# Patient Record
Sex: FEMALE | Race: WHITE
[De-identification: names, ages, dates, MRNs, and addresses within clinical notes are randomized per-mention and may not be internally consistent; named-entity substitution may affect disease eponyms.]

---

## 2017-10-17 ENCOUNTER — HOSPITAL ENCOUNTER (OUTPATIENT)
Dept: HOSPITAL 55 - SURG | Age: 37
Discharge: HOME | End: 2017-10-17
Attending: INTERNAL MEDICINE
Payer: COMMERCIAL

## 2017-10-17 VITALS
HEART RATE: 65 BPM | DIASTOLIC BLOOD PRESSURE: 61 MMHG | SYSTOLIC BLOOD PRESSURE: 102 MMHG | OXYGEN SATURATION: 100 % | RESPIRATION RATE: 16 BRPM | TEMPERATURE: 98.7 F

## 2017-10-17 DIAGNOSIS — R10.11: Primary | ICD-10-CM

## 2017-10-17 PROCEDURE — 43235 EGD DIAGNOSTIC BRUSH WASH: CPT

## 2019-03-26 ENCOUNTER — HOSPITAL ENCOUNTER (OUTPATIENT)
Dept: HOSPITAL 55 - SURG | Age: 39
Discharge: HOME | DRG: 392 | End: 2019-03-26
Attending: INTERNAL MEDICINE
Payer: COMMERCIAL

## 2019-03-26 VITALS
DIASTOLIC BLOOD PRESSURE: 61 MMHG | RESPIRATION RATE: 18 BRPM | SYSTOLIC BLOOD PRESSURE: 105 MMHG | TEMPERATURE: 96.7 F | OXYGEN SATURATION: 100 % | HEART RATE: 61 BPM

## 2019-03-26 DIAGNOSIS — R68.81: ICD-10-CM

## 2019-03-26 DIAGNOSIS — K31.9: ICD-10-CM

## 2019-03-26 DIAGNOSIS — K31.84: ICD-10-CM

## 2019-03-26 DIAGNOSIS — R11.0: ICD-10-CM

## 2019-03-26 DIAGNOSIS — K44.9: ICD-10-CM

## 2019-03-26 DIAGNOSIS — K59.00: ICD-10-CM

## 2019-03-26 DIAGNOSIS — L53.8: ICD-10-CM

## 2019-03-26 DIAGNOSIS — R10.11: Primary | ICD-10-CM

## 2019-03-26 DIAGNOSIS — K64.8: ICD-10-CM

## 2019-03-26 PROCEDURE — 99001 SPECIMEN HANDLING PT-LAB: CPT

## 2021-08-10 ENCOUNTER — TRANSCRIBE ORDERS (OUTPATIENT)
Dept: OTHER | Age: 41
End: 2021-08-10

## 2021-08-10 DIAGNOSIS — I73.89 ACROCYANOSIS (H): ICD-10-CM

## 2021-08-10 DIAGNOSIS — R53.1 WEAKNESS: Primary | ICD-10-CM

## 2021-08-22 ENCOUNTER — HEALTH MAINTENANCE LETTER (OUTPATIENT)
Age: 41
End: 2021-08-22

## 2021-10-07 NOTE — TELEPHONE ENCOUNTER
FUTURE VISIT INFORMATION      FUTURE VISIT INFORMATION:    Date: 10/28/2021    Time: 230pm    Location: [facility]  REFERRAL INFORMATION:    Referring provider:  Virgie ZUÑIGA CNP     Referring providers clinic:  [facility]     Reason for visit/diagnosis  Weakness     RECORDS REQUESTED FROM:       Clinic name Comments Records Status Imaging Status   [facility]-7/28/2021, 8/3/2021    MRI Head-10/19/2020 Care Everywhere Requested to PACS                                   10/7/2021-Request for images faxed to [facility] @ 1114am    10/26/2021-[facility] Images now in PACS-MR @ 607am

## 2021-10-17 ENCOUNTER — HEALTH MAINTENANCE LETTER (OUTPATIENT)
Age: 41
End: 2021-10-17

## 2021-10-26 ASSESSMENT — ENCOUNTER SYMPTOMS
MUSCLE CRAMPS: 1
ABDOMINAL PAIN: 1
DECREASED APPETITE: 1
SYNCOPE: 0
STIFFNESS: 1
WEIGHT LOSS: 0
ALTERED TEMPERATURE REGULATION: 1
HYPOTENSION: 1
NAUSEA: 1
WEAKNESS: 1
TROUBLE SWALLOWING: 1
HEADACHES: 1
NECK MASS: 0
JAUNDICE: 0
MYALGIAS: 1
EYE IRRITATION: 0
EYE PAIN: 0
RECTAL PAIN: 0
NUMBNESS: 1
NERVOUS/ANXIOUS: 1
BRUISES/BLEEDS EASILY: 1
JOINT SWELLING: 1
BLOOD IN STOOL: 0
HEARTBURN: 1
PARALYSIS: 0
DISTURBANCES IN COORDINATION: 1
HALLUCINATIONS: 1
HYPERTENSION: 0
PALPITATIONS: 0
EXERCISE INTOLERANCE: 1
EYE REDNESS: 0
SKIN CHANGES: 0
BLOATING: 1
BOWEL INCONTINENCE: 0
DOUBLE VISION: 0
NECK PAIN: 1
HOARSE VOICE: 0
DIZZINESS: 1
TREMORS: 0
LOSS OF CONSCIOUSNESS: 0
EYE WATERING: 0
MEMORY LOSS: 0
TINGLING: 1
POOR WOUND HEALING: 0
CHILLS: 0
SORE THROAT: 0
POLYPHAGIA: 0
SINUS PAIN: 0
FEVER: 0
CONSTIPATION: 1
SMELL DISTURBANCE: 0
NIGHT SWEATS: 1
INCREASED ENERGY: 1
POLYDIPSIA: 1
SWOLLEN GLANDS: 1
INSOMNIA: 1
PANIC: 0
SPEECH CHANGE: 1
TASTE DISTURBANCE: 0
VOMITING: 0
SLEEP DISTURBANCES DUE TO BREATHING: 0
WEIGHT GAIN: 1
SEIZURES: 0
LIGHT-HEADEDNESS: 1
ARTHRALGIAS: 1
DIARRHEA: 1
DECREASED CONCENTRATION: 1
BACK PAIN: 1
ORTHOPNEA: 0
LEG PAIN: 1
DEPRESSION: 1
MUSCLE WEAKNESS: 1
FATIGUE: 1
SINUS CONGESTION: 1
NAIL CHANGES: 1

## 2021-10-28 ENCOUNTER — OFFICE VISIT (OUTPATIENT)
Dept: NEUROLOGY | Facility: CLINIC | Age: 41
End: 2021-10-28
Payer: COMMERCIAL

## 2021-10-28 ENCOUNTER — LAB (OUTPATIENT)
Dept: LAB | Facility: CLINIC | Age: 41
End: 2021-10-28
Payer: COMMERCIAL

## 2021-10-28 ENCOUNTER — PRE VISIT (OUTPATIENT)
Dept: NEUROLOGY | Facility: CLINIC | Age: 41
End: 2021-10-28

## 2021-10-28 VITALS
SYSTOLIC BLOOD PRESSURE: 103 MMHG | DIASTOLIC BLOOD PRESSURE: 68 MMHG | HEART RATE: 58 BPM | OXYGEN SATURATION: 100 % | RESPIRATION RATE: 16 BRPM

## 2021-10-28 DIAGNOSIS — M79.7 FIBROMYALGIA: ICD-10-CM

## 2021-10-28 DIAGNOSIS — M79.7 FIBROMYALGIA: Primary | ICD-10-CM

## 2021-10-28 LAB
CK SERPL-CCNC: 62 U/L (ref 30–225)
HOLD SPECIMEN: NORMAL
VIT B12 SERPL-MCNC: 575 PG/ML (ref 193–986)

## 2021-10-28 PROCEDURE — 82607 VITAMIN B-12: CPT | Performed by: PATHOLOGY

## 2021-10-28 PROCEDURE — 36415 COLL VENOUS BLD VENIPUNCTURE: CPT | Performed by: PATHOLOGY

## 2021-10-28 PROCEDURE — 99204 OFFICE O/P NEW MOD 45 MIN: CPT | Performed by: PSYCHIATRY & NEUROLOGY

## 2021-10-28 PROCEDURE — 82550 ASSAY OF CK (CPK): CPT | Performed by: PATHOLOGY

## 2021-10-28 PROCEDURE — 82085 ASSAY OF ALDOLASE: CPT | Mod: 90 | Performed by: PATHOLOGY

## 2021-10-28 PROCEDURE — 82595 ASSAY OF CRYOGLOBULIN: CPT | Mod: 90 | Performed by: PATHOLOGY

## 2021-10-28 RX ORDER — NORTRIPTYLINE HCL 10 MG
10 CAPSULE ORAL AT BEDTIME
Qty: 30 CAPSULE | Refills: 11 | Status: SHIPPED | OUTPATIENT
Start: 2021-10-28 | End: 2022-01-27

## 2021-10-28 RX ORDER — ALPRAZOLAM 0.5 MG
TABLET ORAL
COMMUNITY
Start: 2020-06-04 | End: 2022-01-27

## 2021-10-28 RX ORDER — BUPROPION HYDROCHLORIDE 300 MG/1
TABLET ORAL
COMMUNITY
Start: 2021-10-12 | End: 2022-01-27

## 2021-10-28 RX ORDER — MULTIVIT-MIN/IRON/FOLIC ACID/K 18-600-40
CAPSULE ORAL
COMMUNITY
End: 2023-06-29

## 2021-10-28 RX ORDER — RIZATRIPTAN BENZOATE 10 MG/1
TABLET ORAL
COMMUNITY
Start: 2021-09-17 | End: 2022-08-10

## 2021-10-28 RX ORDER — POLYETHYLENE GLYCOL 3350 17 G/17G
POWDER, FOR SOLUTION ORAL
COMMUNITY
End: 2023-01-17

## 2021-10-28 RX ORDER — PROPRANOLOL HYDROCHLORIDE 80 MG/1
TABLET ORAL
COMMUNITY
Start: 2021-08-16 | End: 2022-01-27

## 2021-10-28 RX ORDER — PREDNISONE 20 MG/1
TABLET ORAL
COMMUNITY
Start: 2021-05-14 | End: 2022-01-27

## 2021-10-28 RX ORDER — TOPIRAMATE 50 MG/1
50 TABLET, FILM COATED ORAL
COMMUNITY
Start: 2021-10-12 | End: 2022-08-04

## 2021-10-28 RX ORDER — TRIAMCINOLONE ACETONIDE 55 UG/1
1 SPRAY, METERED NASAL
COMMUNITY

## 2021-10-28 RX ORDER — NORETHINDRONE ACETATE AND ETHINYL ESTRADIOL, ETHINYL ESTRADIOL AND FERROUS FUMARATE 1MG-10(24)
KIT ORAL
COMMUNITY
End: 2022-08-11

## 2021-10-28 RX ORDER — NAPROXEN 500 MG/1
TABLET ORAL
COMMUNITY
Start: 2021-06-07

## 2021-10-28 RX ORDER — CETIRIZINE HYDROCHLORIDE 10 MG/1
TABLET ORAL
COMMUNITY
Start: 2020-05-10 | End: 2024-02-16

## 2021-10-28 RX ORDER — LEVOTHYROXINE SODIUM 112 UG/1
112 TABLET ORAL
COMMUNITY
Start: 2021-05-20 | End: 2024-07-16

## 2021-10-28 RX ORDER — TRAMADOL HYDROCHLORIDE 50 MG/1
TABLET ORAL
COMMUNITY
End: 2023-01-16

## 2021-10-28 RX ORDER — CLINDAMYCIN PHOSPHATE 10 MG/G
GEL TOPICAL
COMMUNITY
Start: 2021-08-20

## 2021-10-28 RX ORDER — ONDANSETRON 4 MG/1
TABLET, FILM COATED ORAL
COMMUNITY
End: 2023-06-29

## 2021-10-28 ASSESSMENT — PATIENT HEALTH QUESTIONNAIRE - PHQ9: SUM OF ALL RESPONSES TO PHQ QUESTIONS 1-9: 10

## 2021-10-28 ASSESSMENT — PAIN SCALES - GENERAL: PAINLEVEL: MODERATE PAIN (5)

## 2021-10-28 NOTE — LETTER
10/28/2021       RE: Hannah Carl  [address] [address] [address]     Dear Colleague,    Thank you for referring your patient, Hannah Carl, to the [facility] NEUROLOGY CLINIC [place] at [facility]. Please see a copy of my visit note below.      Answers for HPI/ROS submitted by the patient on 10/26/2021  General Symptoms: Yes  Skin Symptoms: Yes  HENT Symptoms: Yes  EYE SYMPTOMS: Yes  HEART SYMPTOMS: Yes  LUNG SYMPTOMS: No  INTESTINAL SYMPTOMS: Yes  URINARY SYMPTOMS: No  GYNECOLOGIC SYMPTOMS: No  BREAST SYMPTOMS: No  SKELETAL SYMPTOMS: Yes  BLOOD SYMPTOMS: Yes  NERVOUS SYSTEM SYMPTOMS: Yes  MENTAL HEALTH SYMPTOMS: Yes  Ear pain: No  Ear discharge: No  Hearing loss: No  Tinnitus: Yes  Nosebleeds: No  Congestion: Yes  Sinus pain: No  Trouble swallowing: Yes   Voice hoarseness: No  Mouth sores: No  Sore throat: No  Tooth pain: No  Gum tenderness: No  Bleeding gums: No  Change in taste: No  Change in sense of smell: No  Dry mouth: Yes  Hearing aid used: No  Neck lump: No  Fever: No  Loss of appetite: Yes  Weight loss: No  Weight gain: Yes  Fatigue: Yes  Night sweats: Yes  Chills: No  Increased stress: Yes  Excessive hunger: No  Excessive thirst: Yes  Feeling hot or cold when others believe the temperature is normal: Yes  Loss of height: Yes  Post-operative complications: No  Surgical site pain: No  Hallucinations: Yes  Change in or Loss of Energy: Yes  Hyperactivity: No  Confusion: Yes  Changes in hair: No  Changes in moles/birth marks: No  Itching: Yes  Rashes: No  Changes in nails: Yes  Acne: No  Hair in places you don't want it: No  Change in facial hair: No  Warts: No  Non-healing sores: No  Scarring: No  Flaking of skin: No  Color changes of hands/feet in cold : Yes  Sun sensitivity: No  Skin thickening: No  Eye pain: No  Vision loss: No  Dry eyes: Yes  Watery eyes: No  Eye bulging: No  Double vision: No  Flashing of lights:  Yes  Spots: Yes  Floaters: Yes  Redness: No  Crossed eyes: No  Tunnel Vision: No  Yellowing of eyes: No  Eye irritation: No  Chest pain or pressure: No  Fast or irregular heartbeat: No  Pain in legs with walking: Yes  Trouble breathing while lying down: No  Fingers or toes appear blue: Yes  High blood pressure: No  Low blood pressure: Yes  Fainting: No  Murmurs: No  Pacemaker: No  Varicose veins: Yes  Wake up at night with shortness of breath: No  Light-headedness: Yes  Exercise intolerance: Yes  Heart burn or indigestion: Yes  Nausea: Yes  Vomiting: No  Abdominal pain: Yes  Bloating: Yes  Constipation: Yes  Diarrhea: Yes  Blood in stool: No  Black stools: No  Rectal or Anal pain: No  Fecal incontinence: No  Yellowing of skin or eyes: No  Vomit with blood: No  Change in stools: No  Back pain: Yes  Muscle aches: Yes  Neck pain: Yes  Swollen joints: Yes  Joint pain: Yes  Bone pain: Yes  Muscle cramps: Yes  Muscle weakness: Yes  Joint stiffness: Yes  Bone fracture: No  Edema or swelling: Yes  Anemia: No  Swollen glands: Yes  Easy bleeding or bruising: Yes  Trouble with coordination: Yes  Dizziness or trouble with balance: Yes  Fainting or black-out spells: No  Memory loss: No  Headache: Yes  Seizures: No  Speech problems: Yes  Tingling: Yes  Tremor: No  Weakness: Yes  Difficulty walking: Yes  Paralysis: No  Numbness: Yes  Nervous or Anxious: Yes  Depression: Yes  Trouble sleeping: Yes  Trouble thinking or concentrating: Yes  Mood changes: Yes  Panic attacks: No          Again, thank you for allowing me to participate in the care of your patient.      Sincerely,    Rudi Meier MD

## 2021-10-28 NOTE — NURSING NOTE
Chief Complaint   Patient presents with     Consult     [facility] [place]      Depression Response    Patient completed the PHQ-9 assessment for depression and scored >9? Yes  Question 9 on the PHQ-9 was positive for suicidality? No  Does patient have current mental health provider? No    Is this a virtual visit? No    I personally notified the following: clinic nurse    Patient declined to speak with a nurse for further follow up regarding the depression screening

## 2021-10-28 NOTE — PROGRESS NOTES
Answers for HPI/ROS submitted by the patient on 10/26/2021  General Symptoms: Yes  Skin Symptoms: Yes  HENT Symptoms: Yes  EYE SYMPTOMS: Yes  HEART SYMPTOMS: Yes  LUNG SYMPTOMS: No  INTESTINAL SYMPTOMS: Yes  URINARY SYMPTOMS: No  GYNECOLOGIC SYMPTOMS: No  BREAST SYMPTOMS: No  SKELETAL SYMPTOMS: Yes  BLOOD SYMPTOMS: Yes  NERVOUS SYSTEM SYMPTOMS: Yes  MENTAL HEALTH SYMPTOMS: Yes  Ear pain: No  Ear discharge: No  Hearing loss: No  Tinnitus: Yes  Nosebleeds: No  Congestion: Yes  Sinus pain: No  Trouble swallowing: Yes   Voice hoarseness: No  Mouth sores: No  Sore throat: No  Tooth pain: No  Gum tenderness: No  Bleeding gums: No  Change in taste: No  Change in sense of smell: No  Dry mouth: Yes  Hearing aid used: No  Neck lump: No  Fever: No  Loss of appetite: Yes  Weight loss: No  Weight gain: Yes  Fatigue: Yes  Night sweats: Yes  Chills: No  Increased stress: Yes  Excessive hunger: No  Excessive thirst: Yes  Feeling hot or cold when others believe the temperature is normal: Yes  Loss of height: Yes  Post-operative complications: No  Surgical site pain: No  Hallucinations: Yes  Change in or Loss of Energy: Yes  Hyperactivity: No  Confusion: Yes  Changes in hair: No  Changes in moles/birth marks: No  Itching: Yes  Rashes: No  Changes in nails: Yes  Acne: No  Hair in places you don't want it: No  Change in facial hair: No  Warts: No  Non-healing sores: No  Scarring: No  Flaking of skin: No  Color changes of hands/feet in cold : Yes  Sun sensitivity: No  Skin thickening: No  Eye pain: No  Vision loss: No  Dry eyes: Yes  Watery eyes: No  Eye bulging: No  Double vision: No  Flashing of lights: Yes  Spots: Yes  Floaters: Yes  Redness: No  Crossed eyes: No  Tunnel Vision: No  Yellowing of eyes: No  Eye irritation: No  Chest pain or pressure: No  Fast or irregular heartbeat: No  Pain in legs with walking: Yes  Trouble breathing while lying down: No  Fingers or toes appear blue: Yes  High blood pressure: No  Low blood  pressure: Yes  Fainting: No  Murmurs: No  Pacemaker: No  Varicose veins: Yes  Wake up at night with shortness of breath: No  Light-headedness: Yes  Exercise intolerance: Yes  Heart burn or indigestion: Yes  Nausea: Yes  Vomiting: No  Abdominal pain: Yes  Bloating: Yes  Constipation: Yes  Diarrhea: Yes  Blood in stool: No  Black stools: No  Rectal or Anal pain: No  Fecal incontinence: No  Yellowing of skin or eyes: No  Vomit with blood: No  Change in stools: No  Back pain: Yes  Muscle aches: Yes  Neck pain: Yes  Swollen joints: Yes  Joint pain: Yes  Bone pain: Yes  Muscle cramps: Yes  Muscle weakness: Yes  Joint stiffness: Yes  Bone fracture: No  Edema or swelling: Yes  Anemia: No  Swollen glands: Yes  Easy bleeding or bruising: Yes  Trouble with coordination: Yes  Dizziness or trouble with balance: Yes  Fainting or black-out spells: No  Memory loss: No  Headache: Yes  Seizures: No  Speech problems: Yes  Tingling: Yes  Tremor: No  Weakness: Yes  Difficulty walking: Yes  Paralysis: No  Numbness: Yes  Nervous or Anxious: Yes  Depression: Yes  Trouble sleeping: Yes  Trouble thinking or concentrating: Yes  Mood changes: Yes  Panic attacks: No

## 2021-10-28 NOTE — LETTER
10/28/2021       RE: Hannah Carl  [address] [address] [address]     Dear Colleague,    Thank you for referring your patient, Hannah Carl, to the [facility] NEUROLOGY CLINIC [place] at [facility]. Please see a copy of my visit note below.    Answers for HPI/ROS submitted by the patient on 10/26/2021  General Symptoms: Yes  Skin Symptoms: Yes  HENT Symptoms: Yes  EYE SYMPTOMS: Yes  HEART SYMPTOMS: Yes  LUNG SYMPTOMS: No  INTESTINAL SYMPTOMS: Yes  URINARY SYMPTOMS: No  GYNECOLOGIC SYMPTOMS: No  BREAST SYMPTOMS: No  SKELETAL SYMPTOMS: Yes  BLOOD SYMPTOMS: Yes  NERVOUS SYSTEM SYMPTOMS: Yes  MENTAL HEALTH SYMPTOMS: Yes  Ear pain: No  Ear discharge: No  Hearing loss: No  Tinnitus: Yes  Nosebleeds: No  Congestion: Yes  Sinus pain: No  Trouble swallowing: Yes   Voice hoarseness: No  Mouth sores: No  Sore throat: No  Tooth pain: No  Gum tenderness: No  Bleeding gums: No  Change in taste: No  Change in sense of smell: No  Dry mouth: Yes  Hearing aid used: No  Neck lump: No  Fever: No  Loss of appetite: Yes  Weight loss: No  Weight gain: Yes  Fatigue: Yes  Night sweats: Yes  Chills: No  Increased stress: Yes  Excessive hunger: No  Excessive thirst: Yes  Feeling hot or cold when others believe the temperature is normal: Yes  Loss of height: Yes  Post-operative complications: No  Surgical site pain: No  Hallucinations: Yes  Change in or Loss of Energy: Yes  Hyperactivity: No  Confusion: Yes  Changes in hair: No  Changes in moles/birth marks: No  Itching: Yes  Rashes: No  Changes in nails: Yes  Acne: No  Hair in places you don't want it: No  Change in facial hair: No  Warts: No  Non-healing sores: No  Scarring: No  Flaking of skin: No  Color changes of hands/feet in cold : Yes  Sun sensitivity: No  Skin thickening: No  Eye pain: No  Vision loss: No  Dry eyes: Yes  Watery eyes: No  Eye bulging: No  Double vision: No  Flashing of lights:  Yes  Spots: Yes  Floaters: Yes  Redness: No  Crossed eyes: No  Tunnel Vision: No  Yellowing of eyes: No  Eye irritation: No  Chest pain or pressure: No  Fast or irregular heartbeat: No  Pain in legs with walking: Yes  Trouble breathing while lying down: No  Fingers or toes appear blue: Yes  High blood pressure: No  Low blood pressure: Yes  Fainting: No  Murmurs: No  Pacemaker: No  Varicose veins: Yes  Wake up at night with shortness of breath: No  Light-headedness: Yes  Exercise intolerance: Yes  Heart burn or indigestion: Yes  Nausea: Yes  Vomiting: No  Abdominal pain: Yes  Bloating: Yes  Constipation: Yes  Diarrhea: Yes  Blood in stool: No  Black stools: No  Rectal or Anal pain: No  Fecal incontinence: No  Yellowing of skin or eyes: No  Vomit with blood: No  Change in stools: No  Back pain: Yes  Muscle aches: Yes  Neck pain: Yes  Swollen joints: Yes  Joint pain: Yes  Bone pain: Yes  Muscle cramps: Yes  Muscle weakness: Yes  Joint stiffness: Yes  Bone fracture: No  Edema or swelling: Yes  Anemia: No  Swollen glands: Yes  Easy bleeding or bruising: Yes  Trouble with coordination: Yes  Dizziness or trouble with balance: Yes  Fainting or black-out spells: No  Memory loss: No  Headache: Yes  Seizures: No  Speech problems: Yes  Tingling: Yes  Tremor: No  Weakness: Yes  Difficulty walking: Yes  Paralysis: No  Numbness: Yes  Nervous or Anxious: Yes  Depression: Yes  Trouble sleeping: Yes  Trouble thinking or concentrating: Yes  Mood changes: Yes  Panic attacks: No    Service Date: 10/28/2021    Elvia Orr PA-C  [facility]  [address] [place] [address]    RE:  Hannah Carl  MRN: [id number]  : 1980    Dear Ms. Orr:    We had the privilege of evaluating Ms. Hannah Carl, a 41-year-old with weakness acrocyanosis and myalgias.  She is accompanied by her  who is very supportive.      The story is that she has had pain and tingling in the lower extremities.  The pain is  across the back, but it is not really radicular.  Her muscles have been tender for a while and this sensation in her legs has been present for about 2 years with across the back problems.      An MRI of the lumbar spine was reviewed and is negative.  She has some vague tingling in her legs for perhaps the last 6 months.  They become increasingly tender to touch and she has spasms in her back and legs.      Her history also includes significant migraine, which she is in a good program now with topiramate 1 twice a day on a total dose of 150 mg.  She also uses rizatriptan and sumatriptan wisely, no more than 8 a week.  She has not been on Inderal.  She has had a trial of prednisone in the past.    She reports her toes and hands become purple easily and the question of acrocyanosis has been raised.  She has seen a rheumatologist because of abnormal lab studies and they ruled out any sort of rheumatological disorder due to a positive lupus anticoagulant.  I do not know the extent of that.  She is also has pain across her back.      She is a pharmacist.    CURRENT MEDICATIONS:    1.  Xanax.  2.  Wellbutrin once a day, 300 mg.  3.  Zyrtec.  4.  Multivitamins.  5.  Naprosyn.  6.  Birth control pills.    7.  Omeprazole.  8.  Zofran.    Her past medical history indicates she has suffered from very essentially nothing and has been relatively healthy.    FAMILY HISTORY:  Negative for her coagulation or any kind of immune disorder.  No clotting disorder in the family.    REVIEW OF SYSTEMS:  Otherwise unremarkable.    PHYSICAL EXAMINATION:  She is diffusely tender in the muscles to pressure or pain.  Her strength throughout relatively good.  Sciatic stretch test is negative.  Sensory examination is negative.  Romberg is negative.  Her reflexes are obtainable and symmetrical.  Upper extremities are good.  Cranial nerves are normal.  Back is tender to minimal touch and she cannot move any much in the way of flexion.    IMPRESSION:   She has had clearance by Rheumatology for any rheumatic disorders.  There were some abnormal lab studies.  She has had a lumbar spine MRI, which was normal.  Brain MRI showed was normal.      The picture is of fibromyalgia syndrome.  We will rule out myalgias or myopathy.  Most of the lab studies were ordered and we will also do cryoglobulin because of reported acrocyanosis.  We will do an EMG of the extremities since starting treatment with nortriptyline 10 mg and see if that helps her.      She is otherwise doing very good.  We will see her through video examination in 4 weeks.    Thank you for referring her to the [facility].    Sincerely,     Rudi Meier MD

## 2021-10-28 NOTE — PROGRESS NOTES
Service Date: 10/28/2021    Eliva Orr PA-C  [facility]  [address] [place] [address]    RE:  Hannah Carl  MRN: [id number]  : 1980    Dear Ms. Kirby:    We had the privilege of evaluating Ms. Hannah Carl, a 41-year-old with weakness acrocyanosis and myalgias.  She is accompanied by her  who is very supportive.      The story is that she has had pain and tingling in the lower extremities.  The pain is across the back, but it is not really radicular.  Her muscles have been tender for a while and this sensation in her legs has been present for about 2 years with across the back problems.      An MRI of the lumbar spine was reviewed and is negative.  She has some vague tingling in her legs for perhaps the last 6 months.  They become increasingly tender to touch and she has spasms in her back and legs.      Her history also includes significant migraine, which she is in a good program now with topiramate 1 twice a day on a total dose of 150 mg.  She also uses rizatriptan and sumatriptan wisely, no more than 8 a week.  She has not been on Inderal.  She has had a trial of prednisone in the past.    She reports her toes and hands become purple easily and the question of acrocyanosis has been raised.  She has seen a rheumatologist because of abnormal lab studies and they ruled out any sort of rheumatological disorder due to a positive lupus anticoagulant.  I do not know the extent of that.  She is also has pain across her back.      She is a pharmacist.    CURRENT MEDICATIONS:    1.  Xanax.  2.  Wellbutrin once a day, 300 mg.  3.  Zyrtec.  4.  Multivitamins.  5.  Naprosyn.  6.  Birth control pills.    7.  Omeprazole.  8.  Zofran.    Her past medical history indicates she has suffered from very essentially nothing and has been relatively healthy.    FAMILY HISTORY:  Negative for her coagulation or any kind of immune disorder.  No clotting disorder in the  family.    REVIEW OF SYSTEMS:  Otherwise unremarkable.    PHYSICAL EXAMINATION:  She is diffusely tender in the muscles to pressure or pain.  Her strength throughout relatively good.  Sciatic stretch test is negative.  Sensory examination is negative.  Romberg is negative.  Her reflexes are obtainable and symmetrical.  Upper extremities are good.  Cranial nerves are normal.  Back is tender to minimal touch and she cannot move any much in the way of flexion.    IMPRESSION:  She has had clearance by Rheumatology for any rheumatic disorders.  There were some abnormal lab studies.  She has had a lumbar spine MRI, which was normal.  Brain MRI showed was normal.      The picture is of fibromyalgia syndrome.  We will rule out myalgias or myopathy.  Most of the lab studies were ordered and we will also do cryoglobulin because of reported acrocyanosis.  We will do an EMG of the extremities since starting treatment with nortriptyline 10 mg and see if that helps her.      She is otherwise doing very good.  We will see her through video examination in 4 weeks.    Thank you for referring her to the [facility].    Sincerely,     Rudi Meier MD        D: 10/28/2021   T: 10/28/2021   MT: [place]YLAND    Name:     ERICK SCHMIDT  MRN:      [id number]        Account:      [id number]   :      1980           Service Date: 10/28/2021       Document: [id number]

## 2021-10-30 LAB — ALDOLASE SERPL-CCNC: 1.2 U/L

## 2021-11-03 LAB — CRYOGLOB SER QL: NEGATIVE

## 2021-11-23 ENCOUNTER — OFFICE VISIT (OUTPATIENT)
Dept: NEUROLOGY | Facility: CLINIC | Age: 41
End: 2021-11-23
Attending: PSYCHIATRY & NEUROLOGY
Payer: COMMERCIAL

## 2021-11-23 DIAGNOSIS — M79.661 PAIN IN BOTH LOWER LEGS: ICD-10-CM

## 2021-11-23 DIAGNOSIS — M79.662 PAIN IN BOTH LOWER LEGS: ICD-10-CM

## 2021-11-23 DIAGNOSIS — M79.621 PAIN IN BOTH UPPER ARMS: Primary | ICD-10-CM

## 2021-11-23 DIAGNOSIS — M79.622 PAIN IN BOTH UPPER ARMS: Primary | ICD-10-CM

## 2021-11-23 DIAGNOSIS — M79.7 FIBROMYALGIA: ICD-10-CM

## 2021-11-23 NOTE — LETTER
11/23/2021     RE: Hannah Carl  [address] [address] [address]     Dear Colleague,    Thank you for referring your patient, Hannah Carl, to the [facility] at [facility]. Please see a copy of my visit note below.        [facility]  Electrodiagnostic Laboratory    Nerve Conduction & EMG Report          Patient:       Hannah Carl  Patient ID:    [id number]  Gender:        Female  YOB: 1980  Age:           41 Years 7 Months        History & Examination:  41 year old woman with achy pain in her lower > upper limbs. Symptoms present for about a year. Evaluate for myopathy vs radiculopathy.     Techniques: Motor and sensory conduction studies were done with surface recording electrodes. EMG was done with a concentric needle electrode.      Results:  Nerve conduction studies:  1. Left median-D2, ulnar-D5, radial, sural, and superficial peroneal sensory responses are normal.   2. Left median-APB, ulnar-ADM, peroneal-EDB, and tibial-AH motor responses are normal.     Needle EMG of selected proximal and distal left lower and upper limb muscles was performed as tabulated below. No abnormal spontaneous activity was observed in the sampled muscles. Motor unit potential morphology and recruitment patterns were normal.     Interpretation:  This is a normal study. There is no electrophysiologic evidence of a myopathic or neuropathic process affecting the left upper or lower limb on the basis of this study.     Bijan Truong MD  Department of Neurology        Sensory NCS      Nerve / Sites Rec. Site Onset Peak NP Amp Ref. PP Amp Dist Nash Ref. Temp     ms ms  V  V  V cm m/s m/s  C   L MEDIAN - Dig II Anti      Wrist Dig II 2.14 3.02 34.1 10.0 73.2 14 65.6 48.0 29.3   L ULNAR - Dig V Anti      Wrist Dig V 2.29 3.18 26.1 8.0 60.1 12.5 54.5 48.0 29.2   L RADIAL - Snuff      Forearm Snuff 1.93 2.55 41.4 15.0 65.9 12.5  64.9 48.0 28.8   L SURAL - Lat Mall      Calf Ankle 2.50 3.07 12.8 8.0 12.7 14 56.0 38.0 31   L SUP PERONEAL      Lat Leg Bazzi 2.03 2.50 20.9  21.0 10 49.2 38.0 31       Motor NCS      Nerve / Sites Rec. Site Lat Ref. Amp Ref. Rel Amp Dist Nash Ref. Dur. Area Temp.     ms ms mV mV % cm m/s m/s ms %  C   L MEDIAN - APB      Wrist APB 2.76 4.40 12.9 5.0 100 8   7.97 100 28.9      Elbow APB 6.30  12.9  99.8 21 59.3 48.0 8.18 94.1 29   L ULNAR - ADM      Wrist ADM 2.50 3.50 10.8 5.0 100 8   8.65 100 28.5      B.Elbow ADM 5.63  10.7  99 19 60.8 48.0 8.39 95.5 28.5      A.Elbow ADM 6.98  9.8  91.5 8 59.1 48.0 8.39 91.3 28.4   L DEEP PERONEAL - EDB      Ankle EDB 2.97 6.00 8.3 2.5 100 8   7.03 100 30.7      FibHead EDB 9.64  7.8  94.1 34 51.0 38.0 7.45 98.3 30.7      Pop Fos EDB 11.04  6.9  83.8 8 56.9 38.0 7.40 87.3 30.7   L TIBIAL - AH      Ankle AH 2.45 6.00 12.8 4.0 100 8   5.52 100 30.8      (tcncl) Pop Fos AH 9.43  0.8  6.62 38 54.4 38.0 7.97 12.8 30.8       EMG Summary Table     Spontaneous MUAP Recruitment    IA Fib/PSW Fasc H.F. Amp Dur. PPP Pattern   L. VAST LATERALIS N None None None N N N Normal   L. TIB ANTERIOR N None None None N N N Normal   L. GASTROCN (MED) N None None None N N N Normal   L. GLUTEUS MED N None None None N N N Normal   L. LUMB PSP (L) N None None None       L. DELTOID N None None None N N N Normal   L. TRICEPS N None None None N N N Normal   L. FIRST D INTEROSS N None None None N N N Normal                          Again, thank you for allowing me to participate in the care of your patient.      Sincerely,    Bijan Truong MD

## 2022-01-26 NOTE — PROGRESS NOTES
"[facility]  Section of General Neurology  New To Provider/Return to General Neurology Patient Visit      Hannah Carl MRN# [id number]   Age: 41 year old YOB: 1980     Requesting physician: Referred Self  Elvia Orr     Reason for Consultation: lower extremity pain        History of Presenting Symptoms:   Hannah Carl is a 41 year old female who presents today for evaluation of lower extremity pain    She is a previous patient of Dr. Meier who felt fibromyalgia was the  Most likely diagnosis.  He started her on amitriptiline to see if this would improve symptoms    She also followed with Dr. Mathur in [facility] for migraines.  There it was noted her polyarthralgia has been refractory to injections, was last on topiramate, propranolol for prophyaxis, now off of propranolol.     Per his note:\"We had the privilege of evaluating Ms. Hannah Carl, a 41-year-old with weakness acrocyanosis and myalgias.  She is accompanied by her  who is very supportive.       The story is that she has had pain and tingling in the lower extremities.  The pain is across the back, but it is not really radicular.  Her muscles have been tender for a while and this sensation in her legs has been present for about 2 years with across the back problems.       An MRI of the lumbar spine was reviewed and is negative.  She has some vague tingling in her legs for perhaps the last 6 months.  They become increasingly tender to touch and she has spasms in her back and legs.       Her history also includes significant migraine, which she is in a good program now with topiramate 1 twice a day on a total dose of 150 mg.  She also uses rizatriptan and sumatriptan wisely, no more than 8 a week.  She has not been on Inderal.  She has had a trial of prednisone in the past.     She reports her toes and hands become purple easily and the question of acrocyanosis has been raised.  She has seen a " "rheumatologist because of abnormal lab studies and they ruled out any sort of rheumatological disorder due to a positive lupus anticoagulant.  I do not know the extent of that.  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  She still has had pain her legs since that incident.    She has always had neck pain and she feels like this provokes her migraines.   She gets steroid injections in her neck which helps.  Last injection was 1 year ago.    She still gets tingling and numbness in her legs, especially with long car rides.  She notes muscle spasms in her lower back.  --zanaflex helps  She notes some issues findings words, did not improve with lowered topiramate to 50 mg BID, OK to stay off of propranolol.   She notes her eyes \"spasm\" when she plays video game---shake from side to side.      Migraines: R side of her head.  Has one right now.  Weather sensitive  Lack of sleep is a trigger.  Usually get 2 per month but can cluster x3 days.  Rizatriptan helps.   Mood/stress: not bad.    She previously didn't feel nortriptyline helped at low doses.      She is getting at least 7 hour of sleep.    She has 2 kids, both teenagers, 14 and 17.  They are doing well in school.          Past Medical History:   There is no problem list on file for this patient.    No past medical history on file.     Past Surgical History:   No past surgical history on file.     Social History:     Social History     Tobacco Use     Smoking status: Not on file     Smokeless tobacco: Not on file   Substance Use Topics     Alcohol use: Not on file     Drug use: Not on file        Family History:   No family history on file.     Medications:     Current Outpatient Medications   Medication Sig     ALPRAZolam (XANAX) 0.5 MG tablet TAKE ONE- HALF TO TWO TABLETS BY MOUTH 30-60 MINUTES BEFORE FLYING AS NEEDED FOR ANXIETY     buPROPion " (WELLBUTRIN XL) 300 MG 24 hr tablet Take 1 tablet by mouth once daily     cetirizine (ZYRTEC) 10 MG tablet Take 1 tablet daily     Cholecalciferol (VITAMIN D) 50 MCG (2000 UT) CAPS      clindamycin (CLINDAMAX) 1 % external gel      levothyroxine (SYNTHROID/LEVOTHROID) 112 MCG tablet Take 112 mcg by mouth     Multiple Vitamins-Minerals (HAIR SKIN AND NAILS FORMULA PO)      naproxen (NAPROSYN) 500 MG tablet TAKE 1 TABLET BY MOUTH TWICE DAILY AS NEEDED FOR HEADACHE     Norethin-Eth Estrad-Fe Biphas (LO LOESTRIN FE) 1 MG-10 MCG / 10 MCG TABS      nortriptyline (PAMELOR) 10 MG capsule Take 1 capsule (10 mg) by mouth At Bedtime     omeprazole (PRILOSEC) 20 MG DR capsule Take 20 mg by mouth     ondansetron (ZOFRAN) 4 MG tablet      polyethylene glycol (MIRALAX) 17 GM/Dose powder      predniSONE (DELTASONE) 20 MG tablet  (Patient not taking: Reported on 10/28/2021)     propranolol (INDERAL) 80 MG tablet TAKE 1/2 (ONE-HALF) TABLET BY MOUTH ONCE DAILY IN THE MORNING     rizatriptan (MAXALT) 10 MG tablet TAKE 1 TABLET BY MOUTH AT ONSET OF HEADACHE WITH NAPROXEN. MAX 3 PER DAY AND GENERALLY LESS THAN 2 DAYS PER WEEK.     SUMAtriptan (IMITREX STATDOSE) 6 MG/0.5ML refill cartridge Inject 6 mg Subcutaneous     tiZANidine (ZANAFLEX) 4 MG tablet      topiramate (TOPAMAX) 50 MG tablet TAKE 1 TABLET BY MOUTH IN THE MORNING AND 2 IN THE EVENING     traMADol (ULTRAM) 50 MG tablet      triamcinolone (NASACORT) 55 MCG/ACT nasal aerosol Spray 1 spray in nostril     No current facility-administered medications for this visit.        Allergies:     Allergies   Allergen Reactions     Fluticasone Rash     Latex Itching and Rash     Hydrocodone Headache and Other (See Comments)     Migraines          Review of Systems:   As noted above     Physical Exam:   Vitals: /66 (BP Location: Right arm, Patient Position: Sitting, Cuff Size: Adult Large)   Pulse 58    CV: peripheral pulse appreciated  Lungs: breathing comfortably  Extremities: no  edema    Neuro:   General Appearance: No apparent distress, well-nourished, well-groomed, pleasant     Mental Status: Alert and oriented to person, place, and time. Speech fluent and comprehension intact. No dysarthria.     Cranial Nerves:   II: Visual fields: normal  III: Pupils: 3 mm, equal, round, reactive to light   III,IV,VI: Extraocular Movements: intact   VII: Facial strength: intact without asymmetry  VIII: Hearing: intact grossly  IX: Palate: intact   XII: Tongue movement: normal     Motor Exam:   5/5 Diffusely, though pain limits hip flexion bilaterally to some extent.    No drift is present. No abnormal movements. Tone is normal throughout.    Sensory: intact to light touch, vibration    Coordination: no dysmetria with finger-to-nose bilaterally    Reflexes: biceps, triceps, brachioradialis, patellar, and ankle jerks 2+ and symmetric.            Data: Pertinent prior to visit   Imaging:  MR LUMBAR SPINE wo CONTRAST   EXAM: MR LUMBAR SPINE wo CONTRAST   LOCATION: [facility]   DATE/TIME: 7/28/2021 9:25 AM     INDICATION: Low back pain, > 6 wks. Lumbar radiculopathy with   right-sided weakness. Lumbar radiculopathy. Right leg weakness.     COMPARISON: CTA of the abdomen and pelvis 06/16/2021.   TECHNIQUE: Routine Lumbar Spine MRI without IV contrast.     FINDINGS:   Nomenclature is based on 5 lumbar type vertebral bodies. Anatomic   alignment. Vertebral body height is preserved. There is no marrow or   ligamentous edema. No pars defect. Normal marrow signal. Normal   distal spinal cord and cauda equina with conus medullaris at inferior   L1. 7 mm cystic structure in the left parapelvic region may reflect a   simple-appearing extrarenal parapelvic cyst. This is stable to the   previous CT study. Unremarkable visualized bony pelvis.     T12-L1: Normal disc height and signal. No herniation. Normal facets.   No spinal canal or neural foraminal stenosis.     L1-L2: Normal disc height and signal. No  herniation. Normal facets.   No spinal canal or neural foraminal stenosis.     L2-L3: Normal disc height and signal. No herniation. Normal facets.   No spinal canal or neural foraminal stenosis.     L3-L4: Slight loss of disc signal with normal disc height. Minimal   annular bulge. Central canal and neural foramina are patent. Normal   facets.     L4-L5: Normal disc height and disc signal. The central canal and   foramina are patent. Mild facet arthropathy with small bilateral   facet synovial effusions. Minimal annular bulge flattens the ventral   thecal sac.     L5-S1: Normal disc height and signal. No herniation. Normal facets.   No spinal canal or neural foraminal stenosis.     IMPRESSION:   1.  Mild degenerative changes, detailed above, but without definite   explanation for right leg weakness.      Procedures:  Patient:       Hannah aCrl  Patient ID:    [id number]  Gender:        Female  YOB: 1980  Age:           41 Years 7 Months         History & Examination:  41 year old woman with achy pain in her lower > upper limbs. Symptoms present for about a year. Evaluate for myopathy vs radiculopathy.      Techniques: Motor and sensory conduction studies were done with surface recording electrodes. EMG was done with a concentric needle electrode.      Results:  Nerve conduction studies:  1. Left median-D2, ulnar-D5, radial, sural, and superficial peroneal sensory responses are normal.   2. Left median-APB, ulnar-ADM, peroneal-EDB, and tibial-AH motor responses are normal.      Needle EMG of selected proximal and distal left lower and upper limb muscles was performed as tabulated below. No abnormal spontaneous activity was observed in the sampled muscles. Motor unit potential morphology and recruitment patterns were normal.      Interpretation:  This is a normal study. There is no electrophysiologic evidence of a myopathic or neuropathic process affecting the left upper or lower limb on the basis  of this study.      Bijan Truong MD  Department of Neurology    Laboratory:  B12 575  CK WNL in October         Assessment and Plan:   Assessment:  Angela Carl is a pleasant 41 year old female who presents in further evaluation of migraine headaches, pain and abnormal sensations predominantly in the legs but can be wide spread.  She was previously evaluated by Dr. Mathur in [facility] and Dr. Meier here and the [place] in neurological consultation as well.  She has previously had unremarkable rheumatological work up, normal blood work, non contributory MRI L spine and normal EMG.  We discussed that fibromyalgia would make the most sense diagnostically for her in my opinion in light of these findings.  She broached the notion of starting cymbalta which we discussed is the medication I find most effective in treating this condition, will start as below.     Plan:  --Cymbalta: Start at 20 mg daily x2 weeks then to go 20 mg BID, discussed side effects, rare side effect of excess serotonin possible with PRN tramadol  --She is now off of propranolol, nortryptiline, she will continue topiramate 50 mg BID for headache prevention, maxalt as needed as well.  --She can follow up with me in ~ 3 months virtually, can call or mychart with questions or issues in the mean time.               Ángel Monk MD   of Neurology   [facility]      The total time of this encounter today amounted to 48 minutes. This time included time spent with the patient, prep work, ordering tests, and performing post visit documentation.

## 2022-01-27 ENCOUNTER — OFFICE VISIT (OUTPATIENT)
Dept: NEUROLOGY | Facility: CLINIC | Age: 42
End: 2022-01-27
Payer: COMMERCIAL

## 2022-01-27 VITALS — HEART RATE: 58 BPM | DIASTOLIC BLOOD PRESSURE: 66 MMHG | SYSTOLIC BLOOD PRESSURE: 121 MMHG

## 2022-01-27 DIAGNOSIS — M79.662 PAIN IN BOTH LOWER LEGS: ICD-10-CM

## 2022-01-27 DIAGNOSIS — G43.009 MIGRAINE WITHOUT AURA AND WITHOUT STATUS MIGRAINOSUS, NOT INTRACTABLE: ICD-10-CM

## 2022-01-27 DIAGNOSIS — M79.661 PAIN IN BOTH LOWER LEGS: ICD-10-CM

## 2022-01-27 DIAGNOSIS — M79.7 FIBROMYALGIA: Primary | ICD-10-CM

## 2022-01-27 PROCEDURE — 99215 OFFICE O/P EST HI 40 MIN: CPT | Performed by: STUDENT IN AN ORGANIZED HEALTH CARE EDUCATION/TRAINING PROGRAM

## 2022-01-27 RX ORDER — DULOXETIN HYDROCHLORIDE 20 MG/1
20 CAPSULE, DELAYED RELEASE ORAL 2 TIMES DAILY
Qty: 60 CAPSULE | Refills: 4 | Status: SHIPPED | OUTPATIENT
Start: 2022-01-27 | End: 2022-05-05

## 2022-01-27 ASSESSMENT — PAIN SCALES - GENERAL: PAINLEVEL: MODERATE PAIN (4)

## 2022-01-27 NOTE — LETTER
"    1/27/2022         RE: Hannah Carl  [id number] [address] [address]        Dear Colleague,    Thank you for referring your patient, Hannah Carl, to the [facility] [place]. Please see a copy of my visit note below.    [facility]  Section of General Neurology  New To Provider/Return to General Neurology Patient Visit      Hannah Carl MRN# [id number]   Age: 41 year old YOB: 1980     Requesting physician: Referred Self  Elvia Orr     Reason for Consultation: lower extremity pain        History of Presenting Symptoms:   Hannah Carl is a 41 year old female who presents today for evaluation of lower extremity pain    She is a previous patient of Dr. Meier who felt fibromyalgia was the  Most likely diagnosis.  He started her on amitriptiline to see if this would improve symptoms    She also followed with Dr. Mathur in [facility] for migraines.  There it was noted her polyarthralgia has been refractory to injections, was last on topiramate, propranolol for prophyaxis, now off of propranolol.     Per his note:\"We had the privilege of evaluating Ms. Hannah Carl, a 41-year-old with weakness acrocyanosis and myalgias.  She is accompanied by her  who is very supportive.       The story is that she has had pain and tingling in the lower extremities.  The pain is across the back, but it is not really radicular.  Her muscles have been tender for a while and this sensation in her legs has been present for about 2 years with across the back problems.       An MRI of the lumbar spine was reviewed and is negative.  She has some vague tingling in her legs for perhaps the last 6 months.  They become increasingly tender to touch and she has spasms in her back and legs.       Her history also includes significant migraine, which she is in a good program now with topiramate 1 twice a day on a total dose of 150 " "mg.  She also uses rizatriptan and sumatriptan wisely, no more than 8 a week.  She has not been on Inderal.  She has had a trial of prednisone in the past.     She reports her toes and hands become purple easily and the question of acrocyanosis has been raised.  She has seen a rheumatologist because of abnormal lab studies and they ruled out any sort of rheumatological disorder due to a positive lupus anticoagulant.  I do not know the extent of that.  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  She still has had pain her legs since that incident.    She has always had neck pain and she feels like this provokes her migraines.   She gets steroid injections in her neck which helps.  Last injection was 1 year ago.    She still gets tingling and numbness in her legs, especially with long car rides.  She notes muscle spasms in her lower back.  --zanaflex helps  She notes some issues findings words, did not improve with lowered topiramate to 50 mg BID, OK to stay off of propranolol.   She notes her eyes \"spasm\" when she plays video game---shake from side to side.      Migraines: R side of her head.  Has one right now.  Weather sensitive  Lack of sleep is a trigger.  Usually get 2 per month but can cluster x3 days.  Rizatriptan helps.   Mood/stress: not bad.    She previously didn't feel nortriptyline helped at low doses.      She is getting at least 7 hour of sleep.    She has 2 kids, both teenagers, 14 and 17.  They are doing well in school.          Past Medical History:   There is no problem list on file for this patient.    No past medical history on file.     Past Surgical History:   No past surgical history on file.     Social History:     Social History     Tobacco Use     Smoking status: Not on file     Smokeless tobacco: Not on file   Substance Use Topics     Alcohol use: Not on file     Drug " use: Not on file        Family History:   No family history on file.     Medications:     Current Outpatient Medications   Medication Sig     ALPRAZolam (XANAX) 0.5 MG tablet TAKE ONE- HALF TO TWO TABLETS BY MOUTH 30-60 MINUTES BEFORE FLYING AS NEEDED FOR ANXIETY     buPROPion (WELLBUTRIN XL) 300 MG 24 hr tablet Take 1 tablet by mouth once daily     cetirizine (ZYRTEC) 10 MG tablet Take 1 tablet daily     Cholecalciferol (VITAMIN D) 50 MCG (2000 UT) CAPS      clindamycin (CLINDAMAX) 1 % external gel      levothyroxine (SYNTHROID/LEVOTHROID) 112 MCG tablet Take 112 mcg by mouth     Multiple Vitamins-Minerals (HAIR SKIN AND NAILS FORMULA PO)      naproxen (NAPROSYN) 500 MG tablet TAKE 1 TABLET BY MOUTH TWICE DAILY AS NEEDED FOR HEADACHE     Norethin-Eth Estrad-Fe Biphas (LO LOESTRIN FE) 1 MG-10 MCG / 10 MCG TABS      nortriptyline (PAMELOR) 10 MG capsule Take 1 capsule (10 mg) by mouth At Bedtime     omeprazole (PRILOSEC) 20 MG DR capsule Take 20 mg by mouth     ondansetron (ZOFRAN) 4 MG tablet      polyethylene glycol (MIRALAX) 17 GM/Dose powder      predniSONE (DELTASONE) 20 MG tablet  (Patient not taking: Reported on 10/28/2021)     propranolol (INDERAL) 80 MG tablet TAKE 1/2 (ONE-HALF) TABLET BY MOUTH ONCE DAILY IN THE MORNING     rizatriptan (MAXALT) 10 MG tablet TAKE 1 TABLET BY MOUTH AT ONSET OF HEADACHE WITH NAPROXEN. MAX 3 PER DAY AND GENERALLY LESS THAN 2 DAYS PER WEEK.     SUMAtriptan (IMITREX STATDOSE) 6 MG/0.5ML refill cartridge Inject 6 mg Subcutaneous     tiZANidine (ZANAFLEX) 4 MG tablet      topiramate (TOPAMAX) 50 MG tablet TAKE 1 TABLET BY MOUTH IN THE MORNING AND 2 IN THE EVENING     traMADol (ULTRAM) 50 MG tablet      triamcinolone (NASACORT) 55 MCG/ACT nasal aerosol Spray 1 spray in nostril     No current facility-administered medications for this visit.        Allergies:     Allergies   Allergen Reactions     Fluticasone Rash     Latex Itching and Rash     Hydrocodone Headache and Other (See  Comments)     Migraines          Review of Systems:   As noted above     Physical Exam:   Vitals: /66 (BP Location: Right arm, Patient Position: Sitting, Cuff Size: Adult Large)   Pulse 58    CV: peripheral pulse appreciated  Lungs: breathing comfortably  Extremities: no edema    Neuro:   General Appearance: No apparent distress, well-nourished, well-groomed, pleasant     Mental Status: Alert and oriented to person, place, and time. Speech fluent and comprehension intact. No dysarthria.     Cranial Nerves:   II: Visual fields: normal  III: Pupils: 3 mm, equal, round, reactive to light   III,IV,VI: Extraocular Movements: intact   VII: Facial strength: intact without asymmetry  VIII: Hearing: intact grossly  IX: Palate: intact   XII: Tongue movement: normal     Motor Exam:   5/5 Diffusely, though pain limits hip flexion bilaterally to some extent.    No drift is present. No abnormal movements. Tone is normal throughout.    Sensory: intact to light touch, vibration    Coordination: no dysmetria with finger-to-nose bilaterally    Reflexes: biceps, triceps, brachioradialis, patellar, and ankle jerks 2+ and symmetric.            Data: Pertinent prior to visit   Imaging:  MR LUMBAR SPINE wo CONTRAST   EXAM: MR LUMBAR SPINE wo CONTRAST   LOCATION: [facility]   DATE/TIME: 7/28/2021 9:25 AM     INDICATION: Low back pain, > 6 wks. Lumbar radiculopathy with   right-sided weakness. Lumbar radiculopathy. Right leg weakness.     COMPARISON: CTA of the abdomen and pelvis 06/16/2021.   TECHNIQUE: Routine Lumbar Spine MRI without IV contrast.     FINDINGS:   Nomenclature is based on 5 lumbar type vertebral bodies. Anatomic   alignment. Vertebral body height is preserved. There is no marrow or   ligamentous edema. No pars defect. Normal marrow signal. Normal   distal spinal cord and cauda equina with conus medullaris at inferior   L1. 7 mm cystic structure in the left parapelvic region may reflect a    simple-appearing extrarenal parapelvic cyst. This is stable to the   previous CT study. Unremarkable visualized bony pelvis.     T12-L1: Normal disc height and signal. No herniation. Normal facets.   No spinal canal or neural foraminal stenosis.     L1-L2: Normal disc height and signal. No herniation. Normal facets.   No spinal canal or neural foraminal stenosis.     L2-L3: Normal disc height and signal. No herniation. Normal facets.   No spinal canal or neural foraminal stenosis.     L3-L4: Slight loss of disc signal with normal disc height. Minimal   annular bulge. Central canal and neural foramina are patent. Normal   facets.     L4-L5: Normal disc height and disc signal. The central canal and   foramina are patent. Mild facet arthropathy with small bilateral   facet synovial effusions. Minimal annular bulge flattens the ventral   thecal sac.     L5-S1: Normal disc height and signal. No herniation. Normal facets.   No spinal canal or neural foraminal stenosis.     IMPRESSION:   1.  Mild degenerative changes, detailed above, but without definite   explanation for right leg weakness.      Procedures:  Patient:       Hannah Carl  Patient ID:    [id number]  Gender:        Female  YOB: 1980  Age:           41 Years 7 Months         History & Examination:  41 year old woman with achy pain in her lower > upper limbs. Symptoms present for about a year. Evaluate for myopathy vs radiculopathy.      Techniques: Motor and sensory conduction studies were done with surface recording electrodes. EMG was done with a concentric needle electrode.      Results:  Nerve conduction studies:  1. Left median-D2, ulnar-D5, radial, sural, and superficial peroneal sensory responses are normal.   2. Left median-APB, ulnar-ADM, peroneal-EDB, and tibial-AH motor responses are normal.      Needle EMG of selected proximal and distal left lower and upper limb muscles was performed as tabulated below. No abnormal spontaneous  activity was observed in the sampled muscles. Motor unit potential morphology and recruitment patterns were normal.      Interpretation:  This is a normal study. There is no electrophysiologic evidence of a myopathic or neuropathic process affecting the left upper or lower limb on the basis of this study.      Bijan Truong MD  Department of Neurology    Laboratory:  B12 575  CK WNL in October         Assessment and Plan:   Assessment:  Angela Carl is a pleasant 41 year old female who presents in further evaluation of migraine headaches, pain and abnormal sensations predominantly in the legs but can be wide spread.  She was previously evaluated by Dr. Mathur in [facility] and Dr. Meier here and the [place] in neurological consultation as well.  She has previously had unremarkable rheumatological work up, normal blood work, non contributory MRI L spine and normal EMG.  We discussed that fibromyalgia would make the most sense diagnostically for her in my opinion in light of these findings.  She broached the notion of starting cymbalta which we discussed is the medication I find most effective in treating this condition, will start as below.     Plan:  --Cymbalta: Start at 20 mg daily x2 weeks then to go 20 mg BID, discussed side effects, rare side effect of excess serotonin possible with PRN tramadol  --She is now off of propranolol, nortryptiline, she will continue topiramate 50 mg BID for headache prevention, maxalt as needed as well.  --She can follow up with me in ~ 3 months virtually, can call or mychart with questions or issues in the mean time.               Ángel Monk MD   of Neurology   [facility]      The total time of this encounter today amounted to 48 minutes. This time included time spent with the patient, prep work, ordering tests, and performing post visit documentation.        Again, thank you for allowing me to participate in the care of your  patient.        Sincerely,        Delmar Monk MD

## 2022-01-27 NOTE — PATIENT INSTRUCTIONS
Stop wellbutrin, start cymbalta 20 mg x2 weeks then go to 20 mg BID.   EMG, L spine look good.  Migraines are in good control, think about another injection, continue topiramate daily, maxalt PRN  Let me know if you want to try PT ever if you want to go it on your own, gradual, kind yourself, low impact

## 2022-05-03 NOTE — PROGRESS NOTES
[facility]/[place]  Section of General Neurology  Return Patient  Virtual Visit    Hannah Carl MRN# [id number]   Age: 42 year old YOB: 1980     Brief history of symptoms: The patient was initially seen in neurologic consultation on 1/27/22 for evaluation of diffuse pain. Please see the comprehensive neurologic consultation note from that date in the Epic records for details.         Interval history:   Topiramate 50 mg BID for headaches--still on this dose for migraine prevention.    Cymbalta 20 mg BID for diffuse pain-- is tired, napping a lot---Helped a lot initially but returned in the last 3 weeks, has been more active in last the 3 weeks.   --She was previously noted to be off of propranolol, nortryptiline maxalt as needed as well was a component for migraine prevention.    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   Migraines are better, chocolate was found to be a trigger.    Sleep is OK, unless she wakes up with soreness.    Mood: doing well.      She notes worsening pain when active.  Legs get really sore, numbness and tingling in her legs present as well at times.           Medications:     Current Outpatient Medications   Medication Sig     cetirizine (ZYRTEC) 10 MG tablet Take 1 tablet daily     Cholecalciferol (VITAMIN D) 50 MCG (2000 UT) CAPS      clindamycin (CLINDAMAX) 1 % external gel      DULoxetine (CYMBALTA) 20 MG capsule Take 1 capsule (20 mg) by mouth 2 times daily     levothyroxine (SYNTHROID/LEVOTHROID) 112 MCG tablet Take 112 mcg by mouth     naproxen (NAPROSYN) 500 MG tablet TAKE 1 TABLET BY MOUTH TWICE DAILY AS NEEDED FOR HEADACHE     Norethin-Eth Estrad-Fe Biphas (LO LOESTRIN FE) 1 MG-10 MCG / 10 MCG TABS      ondansetron (ZOFRAN) 4 MG tablet      polyethylene glycol (MIRALAX) 17 GM/Dose powder      rizatriptan (MAXALT) 10 MG tablet TAKE 1 TABLET BY MOUTH AT ONSET OF HEADACHE WITH NAPROXEN. MAX 3 PER DAY AND GENERALLY LESS THAN 2 DAYS  PER WEEK.     SUMAtriptan (IMITREX STATDOSE) 6 MG/0.5ML refill cartridge Inject 6 mg Subcutaneous     tiZANidine (ZANAFLEX) 4 MG tablet      topiramate (TOPAMAX) 50 MG tablet 50 mg 1 tab in AM, 1 in PM     traMADol (ULTRAM) 50 MG tablet      triamcinolone (NASACORT) 55 MCG/ACT nasal aerosol Spray 1 spray in nostril     No current facility-administered medications for this visit.        Allergies:     Allergies   Allergen Reactions     Fluticasone Rash     Latex Itching and Rash     Hydrocodone Headache and Other (See Comments)     Migraines          Review of Systems:   As noted above     Physical Exam:   General: Seated comfortably in no acute distress.  Neurologic:     Mental Status: Fully alert, attentive and oriented. Speech clear and fluent, no paraphasic errors.     Cranial Nerves: EOM appear intact. Facial movements symmetric. Hearing not formally tested but intact to conversation.  No dysarthria.     Motor: No tremors or other abnormal movements observed.      Sensory:Not able to be tested virtually           Data: Pertinent prior to visit   Imaging:  MR LUMBAR SPINE wo CONTRAST   EXAM: MR LUMBAR SPINE wo CONTRAST   LOCATION: [facility]   DATE/TIME: 7/28/2021 9:25 AM     INDICATION: Low back pain, > 6 wks. Lumbar radiculopathy with   right-sided weakness. Lumbar radiculopathy. Right leg weakness.     COMPARISON: CTA of the abdomen and pelvis 06/16/2021.   TECHNIQUE: Routine Lumbar Spine MRI without IV contrast.     FINDINGS:   Nomenclature is based on 5 lumbar type vertebral bodies. Anatomic   alignment. Vertebral body height is preserved. There is no marrow or   ligamentous edema. No pars defect. Normal marrow signal. Normal   distal spinal cord and cauda equina with conus medullaris at inferior   L1. 7 mm cystic structure in the left parapelvic region may reflect a   simple-appearing extrarenal parapelvic cyst. This is stable to the   previous CT study. Unremarkable visualized bony pelvis.      T12-L1: Normal disc height and signal. No herniation. Normal facets.   No spinal canal or neural foraminal stenosis.     L1-L2: Normal disc height and signal. No herniation. Normal facets.   No spinal canal or neural foraminal stenosis.     L2-L3: Normal disc height and signal. No herniation. Normal facets.   No spinal canal or neural foraminal stenosis.     L3-L4: Slight loss of disc signal with normal disc height. Minimal   annular bulge. Central canal and neural foramina are patent. Normal   facets.     L4-L5: Normal disc height and disc signal. The central canal and   foramina are patent. Mild facet arthropathy with small bilateral   facet synovial effusions. Minimal annular bulge flattens the ventral   thecal sac.     L5-S1: Normal disc height and signal. No herniation. Normal facets.   No spinal canal or neural foraminal stenosis.     IMPRESSION:   1.  Mild degenerative changes, detailed above, but without definite   explanation for right leg weakness.        Procedures:  Patient:       Hannah Carl  Patient ID:    [id number]  Gender:        Female  YOB: 1980  Age:           41 Years 7 Months         History & Examination:  41 year old woman with achy pain in her lower > upper limbs. Symptoms present for about a year. Evaluate for myopathy vs radiculopathy.      Techniques: Motor and sensory conduction studies were done with surface recording electrodes. EMG was done with a concentric needle electrode.      Results:  Nerve conduction studies:  1. Left median-D2, ulnar-D5, radial, sural, and superficial peroneal sensory responses are normal.   2. Left median-APB, ulnar-ADM, peroneal-EDB, and tibial-AH motor responses are normal.      Needle EMG of selected proximal and distal left lower and upper limb muscles was performed as tabulated below. No abnormal spontaneous activity was observed in the sampled muscles. Motor unit potential morphology and recruitment patterns were  normal.      Interpretation:  This is a normal study. There is no electrophysiologic evidence of a myopathic or neuropathic process affecting the left upper or lower limb on the basis of this study.      Bijan Truong MD  Department of Neurology    Laboratory:  B12 575  CK WNL in October         Assessment and Plan:   Assessment:  Angela Carl is a pleasant 42 year old female who presents in follow up migraine headaches, pain and abnormal sensations predominantly in the legs.  She was previously evaluated by Dr. Mathur in [facility] and Dr. Meier here and the [place] in neurological consultation as well.  She has previously had unremarkable rheumatological work up, normal blood work, non contributory MRI L spine and normal EMG as noted above.    Her symptoms have improved with cymbalta, but worsened over the last 3 weeks.  Discussed activity modification in this regard but I do think she could benefit from trial of higher doses of cymbalta as well.  I am glad migraine control has been better of late with understanding of triggers (chocolate)     Plan:  --She will remain on topiramate 50 mg BID for migraine prevention.  We can consider tapering off of this potentially pending migraine stability  --Increase cymbalta to 30 mg BID, she will reach out with any new side effects  --Encouraged her to be active but she may need to ramp up into this.   --Follow up in ~3 months virtually or in person.                Ángel Monk MD   of Neurology   [facility]      The total time of this encounter today amounted to 15 minutes of time on video visit and 24 minutes in total. This time included time spent with the patient, prep work, ordering tests, and performing post visit documentation.

## 2022-05-05 ENCOUNTER — VIRTUAL VISIT (OUTPATIENT)
Dept: NEUROLOGY | Facility: CLINIC | Age: 42
End: 2022-05-05
Payer: COMMERCIAL

## 2022-05-05 DIAGNOSIS — M79.662 PAIN IN BOTH LOWER LEGS: ICD-10-CM

## 2022-05-05 DIAGNOSIS — M79.661 PAIN IN BOTH LOWER LEGS: ICD-10-CM

## 2022-05-05 DIAGNOSIS — M79.7 FIBROMYALGIA: ICD-10-CM

## 2022-05-05 DIAGNOSIS — G43.009 MIGRAINE WITHOUT AURA AND WITHOUT STATUS MIGRAINOSUS, NOT INTRACTABLE: ICD-10-CM

## 2022-05-05 PROCEDURE — 99213 OFFICE O/P EST LOW 20 MIN: CPT | Mod: GT | Performed by: STUDENT IN AN ORGANIZED HEALTH CARE EDUCATION/TRAINING PROGRAM

## 2022-05-05 RX ORDER — LUBIPROSTONE 8 UG/1
CAPSULE ORAL
COMMUNITY
Start: 2022-04-01 | End: 2023-06-29

## 2022-05-05 RX ORDER — DULOXETIN HYDROCHLORIDE 30 MG/1
30 CAPSULE, DELAYED RELEASE ORAL 2 TIMES DAILY
Qty: 180 CAPSULE | Refills: 1 | Status: SHIPPED | OUTPATIENT
Start: 2022-05-05 | End: 2022-10-28

## 2022-05-05 NOTE — PATIENT INSTRUCTIONS
Increase cymbalta to 30 mg BID  Continue topiramate for headaches for now, can consider tapering in the future.   Reach out with issues or questions before follow up.

## 2022-05-05 NOTE — PROGRESS NOTES
Angela is a 42 year old who is being evaluated via a billable video visit.      How would you like to obtain your AVS? MyChart  If the video visit is dropped, the invitation should be resent by: Text to cell phone: [phone]   Will anyone else be joining your video visit? No      Video Start Time:   Video-Visit Details    Type of service:  Video Visit  Start time:  10:12 AM    Video End Time: 10:27 AM    Originating Location (pt. Location):     Distant Location (provider location):  [facility] NEUROLOGY CLINIC [place]     Platform used for Video Visit:   Chuck Joe CMA

## 2022-05-05 NOTE — LETTER
5/5/2022         RE: Hannah Carl  [address] [address] [address]        Dear Colleague,    Thank you for referring your patient, Hnanah Carl, to the [facility] NEUROLOGY [facility]IC [place]. Please see a copy of my visit note below.    [facility]/[place]  Section of General Neurology  Return Patient  Virtual Visit    Hannah Carl MRN# [id number]   Age: 42 year old YOB: 1980     Brief history of symptoms: The patient was initially seen in neurologic consultation on 1/27/22 for evaluation of diffuse pain. Please see the comprehensive neurologic consultation note from that date in the Epic records for details.         Interval history:   Topiramate 50 mg BID for headaches--still on this dose for migraine prevention.    Cymbalta 20 mg BID for diffuse pain-- is tired, napping a lot---Helped a lot initially but returned in the last 3 weeks, has been more active in last the 3 weeks.   --She was previously noted to be off of propranolol, nortryptiline maxalt as needed as well was a component for migraine prevention.    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   Migraines are better, chocolate was found to be a trigger.    Sleep is OK, unless she wakes up with soreness.    Mood: doing well.      She notes worsening pain when active.  Legs get really sore, numbness and tingling in her legs present as well at times.           Medications:     Current Outpatient Medications   Medication Sig     cetirizine (ZYRTEC) 10 MG tablet Take 1 tablet daily     Cholecalciferol (VITAMIN D) 50 MCG (2000 UT) CAPS      clindamycin (CLINDAMAX) 1 % external gel      DULoxetine (CYMBALTA) 20 MG capsule Take 1 capsule (20 mg) by mouth 2 times daily     levothyroxine (SYNTHROID/LEVOTHROID) 112 MCG tablet Take 112 mcg by mouth     naproxen (NAPROSYN) 500 MG tablet TAKE 1 TABLET BY MOUTH TWICE DAILY AS NEEDED FOR HEADACHE     Norethin-Eth Estrad-Fe Biphas  (LO LOESTRIN FE) 1 MG-10 MCG / 10 MCG TABS      ondansetron (ZOFRAN) 4 MG tablet      polyethylene glycol (MIRALAX) 17 GM/Dose powder      rizatriptan (MAXALT) 10 MG tablet TAKE 1 TABLET BY MOUTH AT ONSET OF HEADACHE WITH NAPROXEN. MAX 3 PER DAY AND GENERALLY LESS THAN 2 DAYS PER WEEK.     SUMAtriptan (IMITREX STATDOSE) 6 MG/0.5ML refill cartridge Inject 6 mg Subcutaneous     tiZANidine (ZANAFLEX) 4 MG tablet      topiramate (TOPAMAX) 50 MG tablet 50 mg 1 tab in AM, 1 in PM     traMADol (ULTRAM) 50 MG tablet      triamcinolone (NASACORT) 55 MCG/ACT nasal aerosol Spray 1 spray in nostril     No current facility-administered medications for this visit.        Allergies:     Allergies   Allergen Reactions     Fluticasone Rash     Latex Itching and Rash     Hydrocodone Headache and Other (See Comments)     Migraines          Review of Systems:   As noted above     Physical Exam:   General: Seated comfortably in no acute distress.  Neurologic:     Mental Status: Fully alert, attentive and oriented. Speech clear and fluent, no paraphasic errors.     Cranial Nerves: EOM appear intact. Facial movements symmetric. Hearing not formally tested but intact to conversation.  No dysarthria.     Motor: No tremors or other abnormal movements observed.      Sensory:Not able to be tested virtually           Data: Pertinent prior to visit   Imaging:  MR LUMBAR SPINE wo CONTRAST   EXAM: MR LUMBAR SPINE wo CONTRAST   LOCATION: [facility]   DATE/TIME: 7/28/2021 9:25 AM     INDICATION: Low back pain, > 6 wks. Lumbar radiculopathy with   right-sided weakness. Lumbar radiculopathy. Right leg weakness.     COMPARISON: CTA of the abdomen and pelvis 06/16/2021.   TECHNIQUE: Routine Lumbar Spine MRI without IV contrast.     FINDINGS:   Nomenclature is based on 5 lumbar type vertebral bodies. Anatomic   alignment. Vertebral body height is preserved. There is no marrow or   ligamentous edema. No pars defect. Normal marrow signal. Normal    distal spinal cord and cauda equina with conus medullaris at inferior   L1. 7 mm cystic structure in the left parapelvic region may reflect a   simple-appearing extrarenal parapelvic cyst. This is stable to the   previous CT study. Unremarkable visualized bony pelvis.     T12-L1: Normal disc height and signal. No herniation. Normal facets.   No spinal canal or neural foraminal stenosis.     L1-L2: Normal disc height and signal. No herniation. Normal facets.   No spinal canal or neural foraminal stenosis.     L2-L3: Normal disc height and signal. No herniation. Normal facets.   No spinal canal or neural foraminal stenosis.     L3-L4: Slight loss of disc signal with normal disc height. Minimal   annular bulge. Central canal and neural foramina are patent. Normal   facets.     L4-L5: Normal disc height and disc signal. The central canal and   foramina are patent. Mild facet arthropathy with small bilateral   facet synovial effusions. Minimal annular bulge flattens the ventral   thecal sac.     L5-S1: Normal disc height and signal. No herniation. Normal facets.   No spinal canal or neural foraminal stenosis.     IMPRESSION:   1.  Mild degenerative changes, detailed above, but without definite   explanation for right leg weakness.        Procedures:  Patient:       Hannah Carl  Patient ID:    [id number]  Gender:        Female  YOB: 1980  Age:           41 Years 7 Months         History & Examination:  41 year old woman with achy pain in her lower > upper limbs. Symptoms present for about a year. Evaluate for myopathy vs radiculopathy.      Techniques: Motor and sensory conduction studies were done with surface recording electrodes. EMG was done with a concentric needle electrode.      Results:  Nerve conduction studies:  1. Left median-D2, ulnar-D5, radial, sural, and superficial peroneal sensory responses are normal.   2. Left median-APB, ulnar-ADM, peroneal-EDB, and tibial-AH motor responses are  normal.      Needle EMG of selected proximal and distal left lower and upper limb muscles was performed as tabulated below. No abnormal spontaneous activity was observed in the sampled muscles. Motor unit potential morphology and recruitment patterns were normal.      Interpretation:  This is a normal study. There is no electrophysiologic evidence of a myopathic or neuropathic process affecting the left upper or lower limb on the basis of this study.      Bijan Truong MD  Department of Neurology    Laboratory:  B12 575  CK WNL in October         Assessment and Plan:   Assessment:  Angela Carl is a pleasant 42 year old female who presents in follow up migraine headaches, pain and abnormal sensations predominantly in the legs.  She was previously evaluated by Dr. Mathur in [facility] and Dr. Meier here and the [place] in neurological consultation as well.  She has previously had unremarkable rheumatological work up, normal blood work, non contributory MRI L spine and normal EMG as noted above.    Her symptoms have improved with cymbalta, but worsened over the last 3 weeks.  Discussed activity modification in this regard but I do think she could benefit from trial of higher doses of cymbalta as well.  I am glad migraine control has been better of late with understanding of triggers (chocolate)     Plan:  --She will remain on topiramate 50 mg BID for migraine prevention.  We can consider tapering off of this potentially pending migraine stability  --Increase cymbalta to 30 mg BID, she will reach out with any new side effects  --Encouraged her to be active but she may need to ramp up into this.   --Follow up in ~3 months virtually or in person.                Ángel Monk MD   of Neurology   [facility]      The total time of this encounter today amounted to 15 minutes of time on video visit and 24 minutes in total. This time included time spent with the patient, prep  work, ordering tests, and performing post visit documentation.      Angela is a 42 year old who is being evaluated via a billable video visit.      How would you like to obtain your AVS? MyChart  If the video visit is dropped, the invitation should be resent by: Text to cell phone: [phone]   Will anyone else be joining your video visit? No      Video Start Time:   Video-Visit Details    Type of service:  Video Visit  Start time:  10:12 AM    Video End Time: 10:27 AM    Originating Location (pt. Location):     Distant Location (provider location):  [facility] NEUROLOGY CLIN[facility] [place]     Platform used for Video Visit:   Chuck Joe CMA          Again, thank you for allowing me to participate in the care of your patient.        Sincerely,        Delmar Monk MD

## 2022-08-04 ENCOUNTER — TELEPHONE (OUTPATIENT)
Dept: NEUROLOGY | Facility: CLINIC | Age: 42
End: 2022-08-04

## 2022-08-04 DIAGNOSIS — G43.009 MIGRAINE WITHOUT AURA AND WITHOUT STATUS MIGRAINOSUS, NOT INTRACTABLE: Primary | ICD-10-CM

## 2022-08-04 RX ORDER — TOPIRAMATE 50 MG/1
TABLET, FILM COATED ORAL
Qty: 60 TABLET | Refills: 0 | Status: SHIPPED | OUTPATIENT
Start: 2022-08-04 | End: 2022-09-28

## 2022-08-04 NOTE — TELEPHONE ENCOUNTER
Called and left voicemail. Received refill request for topamax. Dr. Monk hasn't prescribed this for her before, and the amount we have is 50mg in the morning and evening and the prescription came as 50mg in the morning and 100mg in the evening. Want to know which is correct. Also, want to know how much she has before she runs out as Dr. Monk is on vacation this week.

## 2022-08-04 NOTE — TELEPHONE ENCOUNTER
[facility] Call [facility]    Phone Message    May a detailed message be left on voicemail: yes     Reason for Call: Other: Pt is returning a call from Jessi. Writer relayed message from Jessi. Pt says she Dr. Monk had told her he would prescribe this med going forward for her. Pt is scheduled for a video visit with Dr. Monk on 08/10. Pt is taking 50 mg in the morning and evening. She only needs about 10 tabs to cover her until her appt.    Action Taken: Message routed to:  Other: [facility]    Travel Screening: Not Applicable

## 2022-08-04 NOTE — TELEPHONE ENCOUNTER
Prescription pended and routed to covering provider to review and sign.     Katty Bishop, RNCC  Neurology/Neurosurgery/PM&R

## 2022-08-05 NOTE — TELEPHONE ENCOUNTER
Writer left detailed message informing pt that 30 day supply of Topamax 50mg has been ordered and sent to her [facility] in [place], [place]. She may call back if needed to further discuss.     Katty Bishop RNCC  Neurology/Neurosurgery/PM&R

## 2022-08-10 ENCOUNTER — TELEPHONE (OUTPATIENT)
Dept: NEUROLOGY | Facility: CLINIC | Age: 42
End: 2022-08-10

## 2022-08-10 ENCOUNTER — VIRTUAL VISIT (OUTPATIENT)
Dept: NEUROLOGY | Facility: CLINIC | Age: 42
End: 2022-08-10
Payer: COMMERCIAL

## 2022-08-10 DIAGNOSIS — G43.009 MIGRAINE WITHOUT AURA AND WITHOUT STATUS MIGRAINOSUS, NOT INTRACTABLE: Primary | ICD-10-CM

## 2022-08-10 PROCEDURE — 99213 OFFICE O/P EST LOW 20 MIN: CPT | Mod: GT | Performed by: STUDENT IN AN ORGANIZED HEALTH CARE EDUCATION/TRAINING PROGRAM

## 2022-08-10 RX ORDER — RIZATRIPTAN BENZOATE 10 MG/1
TABLET ORAL
Qty: 12 TABLET | Refills: 6 | Status: SHIPPED | OUTPATIENT
Start: 2022-08-10 | End: 2023-01-16

## 2022-08-10 NOTE — PROGRESS NOTES
[facility]/[place]  Section of General Neurology  Return Patient  Virtual Visit    Hannah Carl MRN# [id number]   Age: 42 year old YOB: 1980            Assessment and Plan:   Assessment:  Angela Carl is a pleasant 42 year old female who is seen in follow up for migraine headaches as well as diffuse symptoms with previously normal work up felt to likely represent fibromyalgia.  She feels her mood is improved on higher doses of cymbalta.  Migraines remain up and down but she does note brain fog.  We discussed that this can be attributable event to lower doses of topiramate and I suggested we consider cutting down on this now that we are on a higher dose of cymbalta.  I would add daily Magnesium oxide 400 mg and Riboflavin (vitamin b2) 400 mg daily in this regard as we will have less migraine prevention on board at this time.  Maxalt is often effective as needed.  I would favor CGRP antagonists for next trial if we were to add an additional agent (nurtec, emgality discussed)     Plan:  --Magnesium oxide 400 mg Riboflavin (vitamin b2) 400 mg daily  --We will go down to 50 mg at bedtime of topiramate to see if word finding difficulty improves, could consider tapering off completely as well if difficulty persists.    --Continue cymbalta 60 mg daily total  --Maxalt 10 mg as needed refilled, I think this is a good choice for her.   --We will follow up in ~3 months virtually.          Ángel Monk MD   of Neurology   [facility]      Interval history:     Symptoms remain up and down.    Migraines have been better, last month was good, prior to had to go through all 9 of her tablets.  She is now off of her birth control  Has tubes tied, has some abdominal pain/back pain, trying to see if stopping this will help.    Feels her mood is good on the higher does of cymbalta, tolerating OK.    Feeling more forgetful overall, word finding.    Doesn't  tolerate beta blockers with reynauds.  Feels word finding could be related to topiramate.    Magnesium and riboflavin discussed      Past Medical History:   Plan at previous visit:  --She will remain on topiramate 50 mg BID for migraine prevention.  We can consider tapering off of this potentially pending migraine stability  --Increase cymbalta to 30 mg BID, she will reach out with any new side effects  --Encouraged her to be active but she may need to ramp up into this.   --Follow up in ~3 months virtually or in person.  No past medical history on file.     Past Surgical History:   No past surgical history on file.     Social History:         Family History:   No family history on file.     Medications:     Current Outpatient Medications   Medication Sig     cetirizine (ZYRTEC) 10 MG tablet Take 1 tablet daily     Cholecalciferol (VITAMIN D) 50 MCG (2000 UT) CAPS      clindamycin (CLINDAMAX) 1 % external gel      DULoxetine (CYMBALTA) 30 MG capsule Take 1 capsule (30 mg) by mouth 2 times daily     levothyroxine (SYNTHROID/LEVOTHROID) 112 MCG tablet Take 112 mcg by mouth     lubiprostone (AMITIZA) 8 MCG capsule TAKE 1 CAPSULE BY MOUTH TWICE DAILY WITH MEALS     naproxen (NAPROSYN) 500 MG tablet TAKE 1 TABLET BY MOUTH TWICE DAILY AS NEEDED FOR HEADACHE     Norethin-Eth Estrad-Fe Biphas (LO LOESTRIN FE) 1 MG-10 MCG / 10 MCG TABS      ondansetron (ZOFRAN) 4 MG tablet      polyethylene glycol (MIRALAX) 17 GM/Dose powder      rizatriptan (MAXALT) 10 MG tablet TAKE 1 TABLET BY MOUTH AT ONSET OF HEADACHE WITH NAPROXEN. MAX 3 PER DAY AND GENERALLY LESS THAN 2 DAYS PER WEEK.     SUMAtriptan (IMITREX STATDOSE) 6 MG/0.5ML refill cartridge Inject 6 mg Subcutaneous (Patient not taking: Reported on 5/5/2022)     tiZANidine (ZANAFLEX) 4 MG tablet      topiramate (TOPAMAX) 50 MG tablet Take 1 tab (50mg) in the morning and 1 tab (50mg) in the evening     traMADol (ULTRAM) 50 MG tablet  (Patient not taking: Reported on 5/5/2022)      triamcinolone (NASACORT) 55 MCG/ACT nasal aerosol Spray 1 spray in nostril     No current facility-administered medications for this visit.        Allergies:     Allergies   Allergen Reactions     Fluticasone Rash     Latex Itching and Rash     Hydrocodone Headache and Other (See Comments)     Migraines          Review of Systems:   As noted above     Physical Exam:   General: Seated comfortably in no acute distress.  Neurologic:     Mental Status: Fully alert, attentive and oriented. Speech clear and fluent, no paraphasic errors.     Cranial Nerves: EOM appear intact. Facial movements symmetric. Hearing not formally tested but intact to conversation.  No dysarthria.     Motor: No tremors or other abnormal movements observed.      Sensory:Not able to be tested virtually     Coordination: Not tested     Gait: Not tested         Data: Pertinent prior to visit   MR LUMBAR SPINE wo CONTRAST   EXAM: MR LUMBAR SPINE wo CONTRAST   LOCATION: [facility]   DATE/TIME: 7/28/2021 9:25 AM     INDICATION: Low back pain, > 6 wks. Lumbar radiculopathy with   right-sided weakness. Lumbar radiculopathy. Right leg weakness.     COMPARISON: CTA of the abdomen and pelvis 06/16/2021.   TECHNIQUE: Routine Lumbar Spine MRI without IV contrast.     FINDINGS:   Nomenclature is based on 5 lumbar type vertebral bodies. Anatomic   alignment. Vertebral body height is preserved. There is no marrow or   ligamentous edema. No pars defect. Normal marrow signal. Normal   distal spinal cord and cauda equina with conus medullaris at inferior   L1. 7 mm cystic structure in the left parapelvic region may reflect a   simple-appearing extrarenal parapelvic cyst. This is stable to the   previous CT study. Unremarkable visualized bony pelvis.     T12-L1: Normal disc height and signal. No herniation. Normal facets.   No spinal canal or neural foraminal stenosis.     L1-L2: Normal disc height and signal. No herniation. Normal facets.   No spinal  canal or neural foraminal stenosis.     L2-L3: Normal disc height and signal. No herniation. Normal facets.   No spinal canal or neural foraminal stenosis.     L3-L4: Slight loss of disc signal with normal disc height. Minimal   annular bulge. Central canal and neural foramina are patent. Normal   facets.     L4-L5: Normal disc height and disc signal. The central canal and   foramina are patent. Mild facet arthropathy with small bilateral   facet synovial effusions. Minimal annular bulge flattens the ventral   thecal sac.     L5-S1: Normal disc height and signal. No herniation. Normal facets.   No spinal canal or neural foraminal stenosis.     IMPRESSION:   1.  Mild degenerative changes, detailed above, but without definite   explanation for right leg weakness.        Procedures:  Patient:       Hannah Carl  Patient ID:    [id number]  Gender:        Female  YOB: 1980  Age:           41 Years 7 Months         History & Examination:  41 year old woman with achy pain in her lower > upper limbs. Symptoms present for about a year. Evaluate for myopathy vs radiculopathy.      Techniques: Motor and sensory conduction studies were done with surface recording electrodes. EMG was done with a concentric needle electrode.      Results:  Nerve conduction studies:  1. Left median-D2, ulnar-D5, radial, sural, and superficial peroneal sensory responses are normal.   2. Left median-APB, ulnar-ADM, peroneal-EDB, and tibial-AH motor responses are normal.      Needle EMG of selected proximal and distal left lower and upper limb muscles was performed as tabulated below. No abnormal spontaneous activity was observed in the sampled muscles. Motor unit potential morphology and recruitment patterns were normal.      Interpretation:  This is a normal study. There is no electrophysiologic evidence of a myopathic or neuropathic process affecting the left upper or lower limb on the basis of this study.      Bijan Truong  MD  Department of Neurology    Laboratory:  [id number]  CK WNL in October              The total time of this encounter today amounted to 14 minutes of time on video visit and 23 minutes in total. This time included time spent with the patient, prep work, ordering tests, and performing post visit documentation.

## 2022-08-10 NOTE — PROGRESS NOTES
Angela is a 42 year old who is being evaluated via a billable video visit.      How would you like to obtain your AVS? MyChart  If the video visit is dropped, the invitation should be resent by: Text to cell phone: [phone]  Will anyone else be joining your video visit? No        Video-Visit Details      Type of service:  Video Visit    Originating Location (pt. Location): home    Distant Location (provider location):  [facility] NEUROLOGY CLINIC [place]     Platform used for Video Visit:   MARICARMEN Fajardo, CHARLENE ([facility])

## 2022-08-10 NOTE — LETTER
8/10/2022         RE: Hannah Carl  [id number] [address] [address]        Dear Colleague,    Thank you for referring your patient, Hannah Carl, to the [facility] NEUROLOGY CLINIC [place]. Please see a copy of my visit note below.    [facility]/[place]  Section of General Neurology  Return Patient  Virtual Visit    Hannah Carl MRN# [id number]   Age: 42 year old YOB: 1980            Assessment and Plan:   Assessment:  Angela Carl is a pleasant 42 year old female who is seen in follow up for migraine headaches as well as diffuse symptoms with previously normal work up felt to likely represent fibromyalgia.  She feels her mood is improved on higher doses of cymbalta.  Migraines remain up and down but she does note brain fog.  We discussed that this can be attributable event to lower doses of topiramate and I suggested we consider cutting down on this now that we are on a higher dose of cymbalta.  I would add daily Magnesium oxide 400 mg and Riboflavin (vitamin b2) 400 mg daily in this regard as we will have less migraine prevention on board at this time.  Maxalt is often effective as needed.  I would favor CGRP antagonists for next trial if we were to add an additional agent (nurtec, emgality discussed)     Plan:  --Magnesium oxide 400 mg Riboflavin (vitamin b2) 400 mg daily  --We will go down to 50 mg at bedtime of topiramate to see if word finding difficulty improves, could consider tapering off completely as well if difficulty persists.    --Continue cymbalta 60 mg daily total  --Maxalt 10 mg as needed refilled, I think this is a good choice for her.   --We will follow up in ~3 months virtually.          Ángel Monk MD   of Neurology   [facility]      Interval history:     Symptoms remain up and down.    Migraines have been better, last month was good, prior to had to go through all 9 of  her tablets.  She is now off of her birth control  Has tubes tied, has some abdominal pain/back pain, trying to see if stopping this will help.    Feels her mood is good on the higher does of cymbalta, tolerating OK.    Feeling more forgetful overall, word finding.    Doesn't tolerate beta blockers with reynauds.  Feels word finding could be related to topiramate.    Magnesium and riboflavin discussed      Past Medical History:   Plan at previous visit:  --She will remain on topiramate 50 mg BID for migraine prevention.  We can consider tapering off of this potentially pending migraine stability  --Increase cymbalta to 30 mg BID, she will reach out with any new side effects  --Encouraged her to be active but she may need to ramp up into this.   --Follow up in ~3 months virtually or in person.  No past medical history on file.     Past Surgical History:   No past surgical history on file.     Social History:         Family History:   No family history on file.     Medications:     Current Outpatient Medications   Medication Sig     cetirizine (ZYRTEC) 10 MG tablet Take 1 tablet daily     Cholecalciferol (VITAMIN D) 50 MCG (2000 UT) CAPS      clindamycin (CLINDAMAX) 1 % external gel      DULoxetine (CYMBALTA) 30 MG capsule Take 1 capsule (30 mg) by mouth 2 times daily     levothyroxine (SYNTHROID/LEVOTHROID) 112 MCG tablet Take 112 mcg by mouth     lubiprostone (AMITIZA) 8 MCG capsule TAKE 1 CAPSULE BY MOUTH TWICE DAILY WITH MEALS     naproxen (NAPROSYN) 500 MG tablet TAKE 1 TABLET BY MOUTH TWICE DAILY AS NEEDED FOR HEADACHE     Norethin-Eth Estrad-Fe Biphas (LO LOESTRIN FE) 1 MG-10 MCG / 10 MCG TABS      ondansetron (ZOFRAN) 4 MG tablet      polyethylene glycol (MIRALAX) 17 GM/Dose powder      rizatriptan (MAXALT) 10 MG tablet TAKE 1 TABLET BY MOUTH AT ONSET OF HEADACHE WITH NAPROXEN. MAX 3 PER DAY AND GENERALLY LESS THAN 2 DAYS PER WEEK.     SUMAtriptan (IMITREX STATDOSE) 6 MG/0.5ML refill cartridge Inject 6 mg  Subcutaneous (Patient not taking: Reported on 5/5/2022)     tiZANidine (ZANAFLEX) 4 MG tablet      topiramate (TOPAMAX) 50 MG tablet Take 1 tab (50mg) in the morning and 1 tab (50mg) in the evening     traMADol (ULTRAM) 50 MG tablet  (Patient not taking: Reported on 5/5/2022)     triamcinolone (NASACORT) 55 MCG/ACT nasal aerosol Spray 1 spray in nostril     No current facility-administered medications for this visit.        Allergies:     Allergies   Allergen Reactions     Fluticasone Rash     Latex Itching and Rash     Hydrocodone Headache and Other (See Comments)     Migraines          Review of Systems:   As noted above     Physical Exam:   General: Seated comfortably in no acute distress.  Neurologic:     Mental Status: Fully alert, attentive and oriented. Speech clear and fluent, no paraphasic errors.     Cranial Nerves: EOM appear intact. Facial movements symmetric. Hearing not formally tested but intact to conversation.  No dysarthria.     Motor: No tremors or other abnormal movements observed.      Sensory:Not able to be tested virtually     Coordination: Not tested     Gait: Not tested         Data: Pertinent prior to visit   MR LUMBAR SPINE wo CONTRAST   EXAM: MR LUMBAR SPINE wo CONTRAST   LOCATION: [facility]   DATE/TIME: 7/28/2021 9:25 AM     INDICATION: Low back pain, > 6 wks. Lumbar radiculopathy with   right-sided weakness. Lumbar radiculopathy. Right leg weakness.     COMPARISON: CTA of the abdomen and pelvis 06/16/2021.   TECHNIQUE: Routine Lumbar Spine MRI without IV contrast.     FINDINGS:   Nomenclature is based on 5 lumbar type vertebral bodies. Anatomic   alignment. Vertebral body height is preserved. There is no marrow or   ligamentous edema. No pars defect. Normal marrow signal. Normal   distal spinal cord and cauda equina with conus medullaris at inferior   L1. 7 mm cystic structure in the left parapelvic region may reflect a   simple-appearing extrarenal parapelvic cyst. This is  stable to the   previous CT study. Unremarkable visualized bony pelvis.     T12-L1: Normal disc height and signal. No herniation. Normal facets.   No spinal canal or neural foraminal stenosis.     L1-L2: Normal disc height and signal. No herniation. Normal facets.   No spinal canal or neural foraminal stenosis.     L2-L3: Normal disc height and signal. No herniation. Normal facets.   No spinal canal or neural foraminal stenosis.     L3-L4: Slight loss of disc signal with normal disc height. Minimal   annular bulge. Central canal and neural foramina are patent. Normal   facets.     L4-L5: Normal disc height and disc signal. The central canal and   foramina are patent. Mild facet arthropathy with small bilateral   facet synovial effusions. Minimal annular bulge flattens the ventral   thecal sac.     L5-S1: Normal disc height and signal. No herniation. Normal facets.   No spinal canal or neural foraminal stenosis.     IMPRESSION:   1.  Mild degenerative changes, detailed above, but without definite   explanation for right leg weakness.        Procedures:  Patient:       Hannah Carl  Patient ID:    [id number]  Gender:        Female  YOB: 1980  Age:           41 Years 7 Months         History & Examination:  41 year old woman with achy pain in her lower > upper limbs. Symptoms present for about a year. Evaluate for myopathy vs radiculopathy.      Techniques: Motor and sensory conduction studies were done with surface recording electrodes. EMG was done with a concentric needle electrode.      Results:  Nerve conduction studies:  1. Left median-D2, ulnar-D5, radial, sural, and superficial peroneal sensory responses are normal.   2. Left median-APB, ulnar-ADM, peroneal-EDB, and tibial-AH motor responses are normal.      Needle EMG of selected proximal and distal left lower and upper limb muscles was performed as tabulated below. No abnormal spontaneous activity was observed in the sampled muscles. Motor  unit potential morphology and recruitment patterns were normal.      Interpretation:  This is a normal study. There is no electrophysiologic evidence of a myopathic or neuropathic process affecting the left upper or lower limb on the basis of this study.      Bijan Truong MD  Department of Neurology    Laboratory:  B12 [id number]  CK WNL in October              The total time of this encounter today amounted to 14 minutes of time on video visit and 23 minutes in total. This time included time spent with the patient, prep work, ordering tests, and performing post visit documentation.      Angela is a 42 year old who is being evaluated via a billable video visit.      How would you like to obtain your AVS? MyChart  If the video visit is dropped, the invitation should be resent by: Text to cell phone: [phone]  Will anyone else be joining your video visit? No        Video-Visit Details      Type of service:  Video Visit    Originating Location (pt. Location): home    Distant Location (provider location):  [facility] NEUROLOGY CLINIC [place]     Platform used for Video Visit:   MARICARMEN Fajardo, CHARLENE ([facility])        Again, thank you for allowing me to participate in the care of your patient.        Sincerely,        Delmar Monk MD

## 2022-09-27 DIAGNOSIS — G43.009 MIGRAINE WITHOUT AURA AND WITHOUT STATUS MIGRAINOSUS, NOT INTRACTABLE: ICD-10-CM

## 2022-09-27 NOTE — TELEPHONE ENCOUNTER
Pending Prescriptions:                       Disp   Refills    topiramate (TOPAMAX) 50 MG tablet         60 tab*0            Sig: Take 1 tab (50mg) in the morning and 1 tab (50mg)           in the evening      Requested Pharmacy: [facility]    Pt's last office visit: 08/10/2022  Next scheduled office visit: 01/16/2023      Per the RN/LPN medication refill protocol, writer is unable to refill this request.     Helen Potter LPN

## 2022-09-28 RX ORDER — TOPIRAMATE 50 MG/1
TABLET, FILM COATED ORAL
Qty: 60 TABLET | Refills: 0 | Status: SHIPPED | OUTPATIENT
Start: 2022-09-28 | End: 2022-12-01

## 2022-10-03 ENCOUNTER — HEALTH MAINTENANCE LETTER (OUTPATIENT)
Age: 42
End: 2022-10-03

## 2022-10-28 DIAGNOSIS — M79.662 PAIN IN BOTH LOWER LEGS: ICD-10-CM

## 2022-10-28 DIAGNOSIS — G43.009 MIGRAINE WITHOUT AURA AND WITHOUT STATUS MIGRAINOSUS, NOT INTRACTABLE: ICD-10-CM

## 2022-10-28 DIAGNOSIS — M79.7 FIBROMYALGIA: ICD-10-CM

## 2022-10-28 DIAGNOSIS — M79.661 PAIN IN BOTH LOWER LEGS: ICD-10-CM

## 2022-10-28 RX ORDER — DULOXETIN HYDROCHLORIDE 30 MG/1
30 CAPSULE, DELAYED RELEASE ORAL 2 TIMES DAILY
Qty: 180 CAPSULE | Refills: 1 | Status: SHIPPED | OUTPATIENT
Start: 2022-10-28 | End: 2023-01-16

## 2022-10-28 NOTE — TELEPHONE ENCOUNTER
Drug warning alert between Tramadol and Duloxetine. Routed to the provider to review and sign.      Breanna Hawkins RN Care Coordinator   Neurology/Neurosurgery/PM&R/ Pain Management

## 2022-10-28 NOTE — TELEPHONE ENCOUNTER
Pending Prescriptions:                       Disp   Refills    DULoxetine (CYMBALTA) 30 MG capsule       180 ca*1            Sig: Take 1 capsule (30 mg) by mouth 2 times daily      Requested Pharmacy: [facility]    Pt's last office visit: 08/10/2022  Next scheduled office visit: 01/16/2023      Per the RN/LPN medication refill protocol, writer is unable to refill this request.     Helen Potter LPN

## 2022-12-01 DIAGNOSIS — G43.009 MIGRAINE WITHOUT AURA AND WITHOUT STATUS MIGRAINOSUS, NOT INTRACTABLE: ICD-10-CM

## 2022-12-01 RX ORDER — TOPIRAMATE 50 MG/1
TABLET, FILM COATED ORAL
Qty: 60 TABLET | Refills: 0 | Status: SHIPPED | OUTPATIENT
Start: 2022-12-01 | End: 2023-01-16

## 2022-12-01 NOTE — TELEPHONE ENCOUNTER
Rx Authorization:    Requested Medication/ Dose topiramate (TOPAMAX) 50 MG tablet    Date last refill ordered: 9/28/22    Quantity ordered: 60    # refills: 0    Date of last clinic visit with ordering provider: 8/10/22    Date of next clinic visit with ordering provider:     All pertinent protocol data (lab date/result):     Include pertinent information from patients message:

## 2023-01-16 ENCOUNTER — VIRTUAL VISIT (OUTPATIENT)
Dept: NEUROLOGY | Facility: CLINIC | Age: 43
End: 2023-01-16
Payer: COMMERCIAL

## 2023-01-16 DIAGNOSIS — R47.89 WORD FINDING DIFFICULTY: ICD-10-CM

## 2023-01-16 DIAGNOSIS — M79.661 PAIN IN BOTH LOWER LEGS: ICD-10-CM

## 2023-01-16 DIAGNOSIS — G43.009 MIGRAINE WITHOUT AURA AND WITHOUT STATUS MIGRAINOSUS, NOT INTRACTABLE: Primary | ICD-10-CM

## 2023-01-16 DIAGNOSIS — M79.662 PAIN IN BOTH LOWER LEGS: ICD-10-CM

## 2023-01-16 DIAGNOSIS — M79.7 FIBROMYALGIA: ICD-10-CM

## 2023-01-16 PROCEDURE — 99214 OFFICE O/P EST MOD 30 MIN: CPT | Mod: 95 | Performed by: STUDENT IN AN ORGANIZED HEALTH CARE EDUCATION/TRAINING PROGRAM

## 2023-01-16 RX ORDER — RIZATRIPTAN BENZOATE 10 MG/1
TABLET ORAL
Qty: 12 TABLET | Refills: 6 | Status: SHIPPED | OUTPATIENT
Start: 2023-01-16 | End: 2024-05-01

## 2023-01-16 RX ORDER — DULOXETIN HYDROCHLORIDE 30 MG/1
30 CAPSULE, DELAYED RELEASE ORAL 2 TIMES DAILY
Qty: 180 CAPSULE | Refills: 1 | Status: SHIPPED | OUTPATIENT
Start: 2023-01-16 | End: 2023-08-21

## 2023-01-16 RX ORDER — TOPIRAMATE 50 MG/1
TABLET, FILM COATED ORAL
Qty: 90 TABLET | Refills: 2 | Status: SHIPPED | OUTPATIENT
Start: 2023-01-16 | End: 2023-09-26

## 2023-01-16 NOTE — PATIENT INSTRUCTIONS
Plan:  --Continue topamax 50 mg QHS   --Continue cymbalta 30 mg BID  --Maxalt 10 mg as needed   --We will follow up in ~6 months virtually.

## 2023-01-16 NOTE — PROGRESS NOTES
[facility]/[place]  Section of General Neurology  Return Patient  Virtual Visit    Hannah Carl MRN# [id number]   Age: 42 year old YOB: 1980              Assessment and Plan:   Angela Carl is a pleasant 42 year old female who is seen in follow up for migraine headaches as well as diffuse symptoms with previously normal work up felt to likely represent fibromyalgia.  We again primarily focused on her migraine headaches at today's visit though her mood seems to continue to do well on cymbalta.    She is quite pleased with her current migraine control, they are rare and when she does get them maxalt helps.  As such we will not taper further off topamax as this is quite low dose and less likely to be manifesting as a side effect but could consider in the future.    Discussed neuropsychological testing for word finding difficulties. She is so articulate at baseline I think this would be low yield and my suspicion is they would attribute any changes to mood, but certainly if interested we could obtain this data now or in the future.   Also discussed SLP as an option, though unclear if they have speech therapists locally for her.     Plan:  --Continue topamax 50 mg QHS   --Continue cymbalta 30 mg BID  --Maxalt 10 mg as needed   --We will follow up in ~6 months virtually.          Ángel Monk MD   of Neurology   [facility]      Interval history:   Now at 50 mg at bed time of topiramate.  No improvement or change in word finding difficulties.  Continues to blank on words a lot.    Migraine control has been great.  Since September first has only had to use 17 maxalt tablets.  They tend to cluster, sometimes need 1-2 a day.    Did come off of birth control.  Not planning pregnancy/tubes removed previously.    Not on tramadol any more  Is not using Magnesium oxide 400 mg Riboflavin (vitamin b2) 400 mg daily  Alzheimers: Grandmother, great  grandmother (likely), great aunt.  Ages 70s diagnosed.    Discussed SLP, neuropsychological testing.    Things are going well at work  Sleep is good.    Random, out of nowhere word finding issues when they manifest.        Past Medical History:   There is no problem list on file for this patient.    No past medical history on file.     Past Surgical History:   No past surgical history on file.     Social History:         Family History:   No family history on file.     Medications:     Current Outpatient Medications   Medication Sig     cetirizine (ZYRTEC) 10 MG tablet Take 1 tablet daily     Cholecalciferol (VITAMIN D) 50 MCG (2000 UT) CAPS      clindamycin (CLINDAMAX) 1 % external gel      DULoxetine (CYMBALTA) 30 MG capsule Take 1 capsule (30 mg) by mouth 2 times daily     levothyroxine (SYNTHROID/LEVOTHROID) 112 MCG tablet Take 112 mcg by mouth     lubiprostone (AMITIZA) 8 MCG capsule TAKE 1 CAPSULE BY MOUTH TWICE DAILY WITH MEALS     naproxen (NAPROSYN) 500 MG tablet TAKE 1 TABLET BY MOUTH TWICE DAILY AS NEEDED FOR HEADACHE     ondansetron (ZOFRAN) 4 MG tablet      polyethylene glycol (MIRALAX) 17 GM/Dose powder      rizatriptan (MAXALT) 10 MG tablet TAKE 1 TABLET BY MOUTH AT ONSET OF HEADACHE     tiZANidine (ZANAFLEX) 4 MG tablet      topiramate (TOPAMAX) 50 MG tablet Take 1 tab (50mg) in the morning and 1 tab (50mg) in the evening     traMADol (ULTRAM) 50 MG tablet      triamcinolone (NASACORT) 55 MCG/ACT nasal aerosol Spray 1 spray in nostril     No current facility-administered medications for this visit.        Allergies:     Allergies   Allergen Reactions     Fluticasone Rash     Latex Itching and Rash     Hydrocodone Headache and Other (See Comments)     Migraines          Review of Systems:   As noted above     Physical Exam:   General: Seated comfortably in no acute distress.  Neurologic:     Mental Status: Fully alert, attentive and oriented. Speech clear and fluent.  MOCA offered, she feels she would  do well, did not perform this.       Cranial Nerves: EOM intact in all directions. Facial movements symmetric. Hearing not formally tested but intact to conversation.  No dysarthria.     Motor: No tremors or other abnormal movements observed.      Sensory:Not able to be tested virtually  Coordination: Intact to FnF                     The total time of this encounter today amounted to 30 minutes in total. This time included time spent with the patient, prep work, ordering tests, and performing post visit documentation.

## 2023-01-16 NOTE — LETTER
1/16/2023         RE: Hannah Carl  [address] [address] [address]        Dear Colleague,    Thank you for referring your patient, Hannah Carl, to the [facility] [place]. Please see a copy of my visit note below.    [facility]  Section of General Neurology  Return Patient  Virtual Visit    Hannah Carl MRN# [id number]   Age: 42 year old YOB: 1980              Assessment and Plan:   Angela Carl is a pleasant 42 year old female who is seen in follow up for migraine headaches as well as diffuse symptoms with previously normal work up felt to likely represent fibromyalgia.  We again primarily focused on her migraine headaches at today's visit though her mood seems to continue to do well on cymbalta.    She is quite pleased with her current migraine control, they are rare and when she does get them maxalt helps.  As such we will not taper further off topamax as this is quite low dose and less likely to be manifesting as a side effect but could consider in the future.    Discussed neuropsychological testing for word finding difficulties. She is so articulate at baseline I think this would be low yield and my suspicion is they would attribute any changes to mood, but certainly if interested we could obtain this data now or in the future.   Also discussed SLP as an option, though unclear if they have speech therapists locally for her.     Plan:  --Continue topamax 50 mg QHS   --Continue cymbalta 30 mg BID  --Maxalt 10 mg as needed   --We will follow up in ~6 months virtually.          Ángel Monk MD   of Neurology   [facility]      Interval history:   Now at 50 mg at bed time of topiramate.  No improvement or change in word finding difficulties.  Continues to blank on words a lot.    Migraine control has been great.  Since September first has only had to use 17 maxalt  tablets.  They tend to cluster, sometimes need 1-2 a day.    Did come off of birth control.  Not planning pregnancy/tubes removed previously.    Not on tramadol any more  Is not using Magnesium oxide 400 mg Riboflavin (vitamin b2) 400 mg daily  Alzheimers: Grandmother, great grandmother (likely), great aunt.  Ages 70s diagnosed.    Discussed SLP, neuropsychological testing.    Things are going well at work  Sleep is good.    Random, out of nowhere word finding issues when they manifest.        Past Medical History:   There is no problem list on file for this patient.    No past medical history on file.     Past Surgical History:   No past surgical history on file.     Social History:         Family History:   No family history on file.     Medications:     Current Outpatient Medications   Medication Sig     cetirizine (ZYRTEC) 10 MG tablet Take 1 tablet daily     Cholecalciferol (VITAMIN D) 50 MCG (2000 UT) CAPS      clindamycin (CLINDAMAX) 1 % external gel      DULoxetine (CYMBALTA) 30 MG capsule Take 1 capsule (30 mg) by mouth 2 times daily     levothyroxine (SYNTHROID/LEVOTHROID) 112 MCG tablet Take 112 mcg by mouth     lubiprostone (AMITIZA) 8 MCG capsule TAKE 1 CAPSULE BY MOUTH TWICE DAILY WITH MEALS     naproxen (NAPROSYN) 500 MG tablet TAKE 1 TABLET BY MOUTH TWICE DAILY AS NEEDED FOR HEADACHE     ondansetron (ZOFRAN) 4 MG tablet      polyethylene glycol (MIRALAX) 17 GM/Dose powder      rizatriptan (MAXALT) 10 MG tablet TAKE 1 TABLET BY MOUTH AT ONSET OF HEADACHE     tiZANidine (ZANAFLEX) 4 MG tablet      topiramate (TOPAMAX) 50 MG tablet Take 1 tab (50mg) in the morning and 1 tab (50mg) in the evening     traMADol (ULTRAM) 50 MG tablet      triamcinolone (NASACORT) 55 MCG/ACT nasal aerosol Spray 1 spray in nostril     No current facility-administered medications for this visit.        Allergies:     Allergies   Allergen Reactions     Fluticasone Rash     Latex Itching and Rash     Hydrocodone Headache and  Other (See Comments)     Migraines          Review of Systems:   As noted above     Physical Exam:   General: Seated comfortably in no acute distress.  Neurologic:     Mental Status: Fully alert, attentive and oriented. Speech clear and fluent.  MOCA offered, she feels she would do well, did not perform this.       Cranial Nerves: EOM intact in all directions. Facial movements symmetric. Hearing not formally tested but intact to conversation.  No dysarthria.     Motor: No tremors or other abnormal movements observed.      Sensory:Not able to be tested virtually  Coordination: Intact to FnF                     The total time of this encounter today amounted to 30 minutes in total. This time included time spent with the patient, prep work, ordering tests, and performing post visit documentation.      Angela is a 42 year old who is being evaluated via a billable video visit.      How would you like to obtain your AVS? MyChart  If the video visit is dropped, the invitation should be resent by: Text to cell phone: [phone]  Will anyone else be joining your video visit? No        Video-Visit Details    Type of service:  Video Visit     Originating Location (pt. Location): home    Distant Location (provider location):  off site  Platform used for Video Visit: Definition 6  Video data: 9:43-10:01 18 minutes  DAVID Fajardo., CHARLENE ([facility])          Again, thank you for allowing me to participate in the care of your patient.        Sincerely,        Delmar Monk MD

## 2023-01-16 NOTE — PROGRESS NOTES
Angela is a 42 year old who is being evaluated via a billable video visit.      How would you like to obtain your AVS? MyChart  If the video visit is dropped, the invitation should be resent by: Text to cell phone: [phone]  Will anyone else be joining your video visit? No        Video-Visit Details    Type of service:  Video Visit     Originating Location (pt. Location): home    Distant Location (provider location):  off site  Platform used for Video Visit: BuzzElement  Video data: 9:43-10:01 18 minutes  MARICARMEN Fajardo, CHARLENE ([facility])

## 2023-06-28 NOTE — PROGRESS NOTES
[facility]  Section of General Neurology  Return Patient  Virtual Visit    Hannah Carl MRN# [id number]   Age: 43 year old YOB: 1980             Assessment and Plan:   Assessment:  Angela Carl is a pleasant 43 year old female who is seen in follow up for migraine headaches as well as diffuse symptoms with previously normal work up felt to likely represent fibromyalgia.  Regarding her migraines today--these overall remain much improved.  Has good and bad months.  Given some recent changes she has re increased her topamax to 50 mg BID.  Cymbalta now down to 30 mg daily as felt she was emotionally blunted at higher doses.  Now on lyrica for diffuse pains which is helpful.  Memory loss remains a concern as is word finding difficulty.  She has a FH in her grandmother, several great aunts, she is suspcious her mother is showing signs/symptoms.  We discussed ways to further work this up as below given it's persistence.       Plan:  For migraine headaches: will continue topamax 50 mg BID, cymbalta 30 mg daily, maxalt PRN.    For memory: optimize mood sleep, headache control  Blood work I typically recommend: Vitamin b12, b1, RPR, HIV, TSH--can get locally   Also discussed Alzheimers specific blood work, newer, in AVS, she could see if this might be covered, has a good negative predictive value.   Speech therapy--locally, will coordinate  MRI brain w/o--locally, will coordinate  Neuropsychological testing to help us further understands risk/benefit of memory medications in her case and for further diagnostic clarity.  Mood and pain changes are independent memory risk factors as above but given FH and how limiting it is quite reasonable to explore further.   Genetics given FH, order placed  To see me back to review neuropsych testing, to reach out soon with issues questions or changes        Ángel Monk MD   of Neurology   [facility]  [place]      Interval history:     Cymbalta--still tolerating fine.  Reduced to 30 mg nightly.  Emotions were out of whack on twice a day dosing. Trouble experiencing emotion e.g.  Pain is still covered.   Also now on lyrica for pain, especially in back  50 at night.  Discussed this dosing  She is hoping to exercise more.      Topamax--bumped back up to twice a day 3 weeks ago because were worsening catamenially.    No big change of yet on this in terms of improvement.  She feels like she is going crazy, still can't remember anything.   People at work are making fun of her she notes.   Short term memory a problem.  Will forget names for everyday things.    Speech therapy:  I would recommend this.    Dementia FH:  Great aunts, grandmother did, she thinks her mom in her 50s is showing signs of it.    Maxalt PRN--remains effective    Migraines --very few.  ~1 a month commonly, 4-5 in April.   Maxalt still helps when she needs it .     A/P at previous visit  Angela Carl is a pleasant 42 year old female who is seen in follow up for migraine headaches as well as diffuse symptoms with previously normal work up felt to likely represent fibromyalgia.  We again primarily focused on her migraine headaches at today's visit though her mood seems to continue to do well on cymbalta.    She is quite pleased with her current migraine control, they are rare and when she does get them maxalt helps.  As such we will not taper further off topamax as this is quite low dose and less likely to be manifesting as a side effect but could consider in the future.    Discussed neuropsychological testing for word finding difficulties. She is so articulate at baseline I think this would be low yield and my suspicion is they would attribute any changes to mood, but certainly if interested we could obtain this data now or in the future.   Also discussed SLP as an option, though unclear if they have speech therapists locally for  her.     Plan:  --Continue topamax 50 mg QHS   --Continue cymbalta 30 mg BID  --Maxalt 10 mg as needed   --We will follow up in ~6 months virtually.    Past Medical History:   There is no problem list on file for this patient.    No past medical history on file.     Past Surgical History:   No past surgical history on file.     Social History:         Family History:   No family history on file.     Medications:     Current Outpatient Medications   Medication Sig     cetirizine (ZYRTEC) 10 MG tablet Take 1 tablet daily     clindamycin (CLINDAMAX) 1 % external gel      DULoxetine (CYMBALTA) 30 MG capsule Take 1 capsule (30 mg) by mouth 2 times daily     loratadine (CLARITIN REDITABS) 10 MG ODT Take 10 mg by mouth     montelukast (SINGULAIR) 10 MG tablet Take 1 tablet by mouth every morning     naproxen (NAPROSYN) 500 MG tablet TAKE 1 TABLET BY MOUTH TWICE DAILY AS NEEDED FOR HEADACHE     pregabalin (LYRICA) 50 MG capsule TAKE 1 CAPSULE BY MOUTH ONCE DAILY IN THE EVENING     rizatriptan (MAXALT) 10 MG tablet TAKE 1 TABLET BY MOUTH AT ONSET OF HEADACHE     SAXENDA 18 MG/3ML pen INJECT 0.6MG SUBCUTANEOUSLY IN THE MORNING FOR 7 DAYS, THEN INCREASE TO 1.2MG FOR 7 DAYS, THEN 1.8MG FOR 7 DAYS, THEN 2.4MG DURING WEEK 4     tiZANidine (ZANAFLEX) 4 MG tablet      topiramate (TOPAMAX) 50 MG tablet Take 1 tab (50mg) in the morning and 1 tab (50mg) in the evening     triamcinolone (NASACORT) 55 MCG/ACT nasal aerosol Spray 1 spray in nostril     Cholecalciferol (VITAMIN D) 50 MCG (2000 UT) CAPS      levothyroxine (SYNTHROID/LEVOTHROID) 112 MCG tablet Take 112 mcg by mouth     lubiprostone (AMITIZA) 8 MCG capsule TAKE 1 CAPSULE BY MOUTH TWICE DAILY WITH MEALS     ondansetron (ZOFRAN) 4 MG tablet      No current facility-administered medications for this visit.        Allergies:     Allergies   Allergen Reactions     Fluticasone Rash     Latex Itching and Rash     Hydrocodone Headache and Other (See Comments)     Migraines           Review of Systems:   As noted above     Physical Exam:   Video feed is sluggish had to incorporate telephone call on top of video feed given poor connection later in visit.  3/3 immediate recall  [place] math: intact  WORLD: backwards intact  Naming intact  Fund of knowledge: intact  3/3 delayed recall                     The total time of this encounter today amounted to 24 minutes of time on video visit and 40 minutes in total. This time included time spent with the patient, prep work, ordering tests, and performing post visit documentation.

## 2023-06-29 ENCOUNTER — TELEPHONE (OUTPATIENT)
Dept: NEUROLOGY | Facility: CLINIC | Age: 43
End: 2023-06-29

## 2023-06-29 ENCOUNTER — VIRTUAL VISIT (OUTPATIENT)
Dept: NEUROLOGY | Facility: CLINIC | Age: 43
End: 2023-06-29
Payer: COMMERCIAL

## 2023-06-29 DIAGNOSIS — Z81.8 FAMILY HISTORY OF DEMENTIA: Primary | ICD-10-CM

## 2023-06-29 DIAGNOSIS — R41.3 MEMORY CHANGES: ICD-10-CM

## 2023-06-29 DIAGNOSIS — G43.009 MIGRAINE WITHOUT AURA AND WITHOUT STATUS MIGRAINOSUS, NOT INTRACTABLE: ICD-10-CM

## 2023-06-29 DIAGNOSIS — M79.7 FIBROMYALGIA: ICD-10-CM

## 2023-06-29 PROCEDURE — 99215 OFFICE O/P EST HI 40 MIN: CPT | Mod: VID | Performed by: STUDENT IN AN ORGANIZED HEALTH CARE EDUCATION/TRAINING PROGRAM

## 2023-06-29 RX ORDER — PREGABALIN 50 MG/1
100 CAPSULE ORAL 2 TIMES DAILY
COMMUNITY
Start: 2023-06-24 | End: 2024-06-04

## 2023-06-29 RX ORDER — LORATADINE 10 MG/1
10 TABLET, ORALLY DISINTEGRATING ORAL
COMMUNITY
End: 2024-07-16

## 2023-06-29 RX ORDER — LIRAGLUTIDE 6 MG/ML
INJECTION, SOLUTION SUBCUTANEOUS
COMMUNITY
Start: 2023-06-20 | End: 2023-12-05

## 2023-06-29 RX ORDER — MONTELUKAST SODIUM 10 MG/1
1 TABLET ORAL EVERY MORNING
COMMUNITY
Start: 2023-06-24

## 2023-06-29 NOTE — PROGRESS NOTES
Angela is a 43 year old who is being evaluated via a billable video visit.      How would you like to obtain your AVS? MyChart  If the video visit is dropped, the invitation should be resent by: Text to cell phone: [phone]  Will anyone else be joining your video visit? No      Video-Visit Details    Type of service:  Video /tele Visit   Time: 1000 AM-10:24 AM    Originating Location (pt. Location): Home  Distant Location (provider location):  On-site  Platform used for Video Visit: MARICARMEN [address], CHARLENE ([facility])

## 2023-06-29 NOTE — LETTER
6/29/2023         RE: Hannah Carl  [address] [address] [address]        Dear Colleague,    Thank you for referring your patient, Hannah Carl, to the [facility]UROLOG[facility] [place]. Please see a copy of my visit note below.    [facility]  Section of General Neurology  Return Patient  Virtual Visit    Hannah Carl MRN# [id number]   Age: 43 year old YOB: 1980             Assessment and Plan:   Assessment:  Angela Carl is a pleasant 43 year old female who is seen in follow up for migraine headaches as well as diffuse symptoms with previously normal work up felt to likely represent fibromyalgia.  Regarding her migraines today--these overall remain much improved.  Has good and bad months.  Given some recent changes she has re increased her topamax to 50 mg BID.  Cymbalta now down to 30 mg daily as felt she was emotionally blunted at higher doses.  Now on lyrica for diffuse pains which is helpful.  Memory loss remains a concern as is word finding difficulty.  She has a FH in her grandmother, several great aunts, she is suspcious her mother is showing signs/symptoms.  We discussed ways to further work this up as below given it's persistence.       Plan:  For migraine headaches: will continue topamax 50 mg BID, cymbalta 30 mg daily, maxalt PRN.    For memory: optimize mood sleep, headache control  Blood work I typically recommend: Vitamin b12, b1, RPR, HIV, TSH--can get locally   Also discussed Alzheimers specific blood work, newer, in AVS, she could see if this might be covered, has a good negative predictive value.   Speech therapy--locally, will coordinate  MRI brain w/o--locally, will coordinate  Neuropsychological testing to help us further understands risk/benefit of memory medications in her case and for further diagnostic clarity.  Mood and pain changes are independent memory risk factors as above but given FH and how  limiting it is quite reasonable to explore further.   Genetics given FH, order placed  To see me back to review neuropsych testing, to reach out soon with issues questions or changes        Ángel Monk MD   of Neurology   [facility]      Interval history:     Cymbalta--still tolerating fine.  Reduced to 30 mg nightly.  Emotions were out of whack on twice a day dosing. Trouble experiencing emotion e.g.  Pain is still covered.   Also now on lyrica for pain, especially in back  50 at night.  Discussed this dosing  She is hoping to exercise more.      Topamax--bumped back up to twice a day 3 weeks ago because were worsening catamenially.    No big change of yet on this in terms of improvement.  She feels like she is going crazy, still can't remember anything.   People at work are making fun of her she notes.   Short term memory a problem.  Will forget names for everyday things.    Speech therapy:  I would recommend this.    Dementia FH:  Great aunts, grandmother did, she thinks her mom in her 50s is showing signs of it.    Maxalt PRN--remains effective    Migraines --very few.  ~1 a month commonly, 4-5 in April.   Maxalt still helps when she needs it .     A/P at previous visit  Angela Carl is a pleasant 42 year old female who is seen in follow up for migraine headaches as well as diffuse symptoms with previously normal work up felt to likely represent fibromyalgia.  We again primarily focused on her migraine headaches at today's visit though her mood seems to continue to do well on cymbalta.    She is quite pleased with her current migraine control, they are rare and when she does get them maxalt helps.  As such we will not taper further off topamax as this is quite low dose and less likely to be manifesting as a side effect but could consider in the future.    Discussed neuropsychological testing for word finding difficulties. She is so articulate at baseline I think  this would be low yield and my suspicion is they would attribute any changes to mood, but certainly if interested we could obtain this data now or in the future.   Also discussed SLP as an option, though unclear if they have speech therapists locally for her.     Plan:  --Continue topamax 50 mg QHS   --Continue cymbalta 30 mg BID  --Maxalt 10 mg as needed   --We will follow up in ~6 months virtually.    Past Medical History:   There is no problem list on file for this patient.    No past medical history on file.     Past Surgical History:   No past surgical history on file.     Social History:         Family History:   No family history on file.     Medications:     Current Outpatient Medications   Medication Sig     cetirizine (ZYRTEC) 10 MG tablet Take 1 tablet daily     clindamycin (CLINDAMAX) 1 % external gel      DULoxetine (CYMBALTA) 30 MG capsule Take 1 capsule (30 mg) by mouth 2 times daily     loratadine (CLARITIN REDITABS) 10 MG ODT Take 10 mg by mouth     montelukast (SINGULAIR) 10 MG tablet Take 1 tablet by mouth every morning     naproxen (NAPROSYN) 500 MG tablet TAKE 1 TABLET BY MOUTH TWICE DAILY AS NEEDED FOR HEADACHE     pregabalin (LYRICA) 50 MG capsule TAKE 1 CAPSULE BY MOUTH ONCE DAILY IN THE EVENING     rizatriptan (MAXALT) 10 MG tablet TAKE 1 TABLET BY MOUTH AT ONSET OF HEADACHE     SAXENDA 18 MG/3ML pen INJECT 0.6MG SUBCUTANEOUSLY IN THE MORNING FOR 7 DAYS, THEN INCREASE TO 1.2MG FOR 7 DAYS, THEN 1.8MG FOR 7 DAYS, THEN 2.4MG DURING WEEK 4     tiZANidine (ZANAFLEX) 4 MG tablet      topiramate (TOPAMAX) 50 MG tablet Take 1 tab (50mg) in the morning and 1 tab (50mg) in the evening     triamcinolone (NASACORT) 55 MCG/ACT nasal aerosol Spray 1 spray in nostril     Cholecalciferol (VITAMIN D) 50 MCG (2000 UT) CAPS      levothyroxine (SYNTHROID/LEVOTHROID) 112 MCG tablet Take 112 mcg by mouth     lubiprostone (AMITIZA) 8 MCG capsule TAKE 1 CAPSULE BY MOUTH TWICE DAILY WITH MEALS     ondansetron  (ZOFRAN) 4 MG tablet      No current facility-administered medications for this visit.        Allergies:     Allergies   Allergen Reactions     Fluticasone Rash     Latex Itching and Rash     Hydrocodone Headache and Other (See Comments)     Migraines          Review of Systems:   As noted above     Physical Exam:   Video feed is sluggish had to incorporate telephone call on top of video feed given poor connection later in visit.  3/3 immediate recall  [place] math: intact  WORLD: backwards intact  Naming intact  Fund of knowledge: intact  3/3 delayed recall                     The total time of this encounter today amounted to 24 minutes of time on video visit and 40 minutes in total. This time included time spent with the patient, prep work, ordering tests, and performing post visit documentation.      Angela is a 43 year old who is being evaluated via a billable video visit.      How would you like to obtain your AVS? MyChart  If the video visit is dropped, the invitation should be resent by: Text to cell phone: [phone]  Will anyone else be joining your video visit? No      Video-Visit Details    Type of service:  Video /tele Visit   Time: 1000 AM-10:24 AM    Originating Location (pt. Location): Home  Distant Location (provider location):  On-site  Platform used for Video Visit: MARICARMEN [address], CHARLENE ([facility])        Again, thank you for allowing me to participate in the care of your patient.        Sincerely,        Delmar Monk MD

## 2023-06-29 NOTE — TELEPHONE ENCOUNTER
Writer faxed orders for speech, neuropsychology and genetics/metabolism referral to [facility] Attn: Anel Guidry Nursing staff/ scheduling team. Writer included current OV note face sheet and orders to [phone], [phone] and [phone].  Writer also warm transferred patient to imaging scheduling for MRI and 3 Month follow up scheduled.  Writer also gave clinic  e mail address to patient to email   disability  forms to our clinic for Dr Monk to fill out  for the patient's employer.    DAVID Fajardo., CHARLENE ([facility])

## 2023-06-29 NOTE — PATIENT INSTRUCTIONS
Alzheimers blood test:    Preferred Lab: Other Laboratory    Laboratory: [facility]    Test Name: [facility] AD-Detect , Beta-Amyloid 42/40 Ratio, Plasma Test Code 64634  Info above--can check and see on coverage      For memory: optimize mood sleep, headache control  Vitamin b12, b1, RPR, HIV, TSH--can get locally     Speech therapy  MRI brain  Neuropsych testing  Genetics given FH  See me back to review neuropsych testing

## 2023-07-10 ENCOUNTER — TELEPHONE (OUTPATIENT)
Dept: NEUROPSYCHOLOGY | Facility: CLINIC | Age: 43
End: 2023-07-10
Payer: COMMERCIAL

## 2023-07-10 NOTE — TELEPHONE ENCOUNTER
Patient Contacted to schedule the following:    Appointment type: Neuropsych Eval  Provider: Any provider [facility] [place] [place], or   Return date: First available  Specialty phone number: [phone]  Additional appointment(s) needed: N/A  Additonal Notes: N/A    Spoke with patient, scheduled on 2/13/2024 at 8am with Dr. Dickinson in .     Ezekiel Vivar on 7/10/2023 at 1:23 PM

## 2023-07-13 ENCOUNTER — TELEPHONE (OUTPATIENT)
Dept: NEUROLOGY | Facility: CLINIC | Age: 43
End: 2023-07-13
Payer: COMMERCIAL

## 2023-07-13 NOTE — TELEPHONE ENCOUNTER
Writer faxed signed Speech therapy orders to [facility] for patient at Dr Monk's request.  Faxed to [phone].  Verified by rightfax.  Paperwork sent to HIMs for scan.  DAVID Fajardo., CHARLENE ([facility])

## 2023-08-09 DIAGNOSIS — G43.009 MIGRAINE WITHOUT AURA AND WITHOUT STATUS MIGRAINOSUS, NOT INTRACTABLE: Primary | ICD-10-CM

## 2023-08-09 RX ORDER — METHYLPREDNISOLONE 4 MG
TABLET, DOSE PACK ORAL
Qty: 21 TABLET | Refills: 0 | Status: SHIPPED | OUTPATIENT
Start: 2023-08-09 | End: 2023-11-20

## 2023-08-21 DIAGNOSIS — M79.7 FIBROMYALGIA: ICD-10-CM

## 2023-08-21 DIAGNOSIS — G43.009 MIGRAINE WITHOUT AURA AND WITHOUT STATUS MIGRAINOSUS, NOT INTRACTABLE: ICD-10-CM

## 2023-08-21 DIAGNOSIS — M79.661 PAIN IN BOTH LOWER LEGS: ICD-10-CM

## 2023-08-21 DIAGNOSIS — M79.662 PAIN IN BOTH LOWER LEGS: ICD-10-CM

## 2023-08-21 NOTE — TELEPHONE ENCOUNTER
Received fax from pharmacy as follows: INS won't cover BID dosing, pt is only taking 1qd, please resend with correct sig    Medication listed is duloxetine 30mg cap

## 2023-08-21 NOTE — TELEPHONE ENCOUNTER
Pt confirmed that she is still taking duloxetine 1 capsule at bedtime. She currently has 2 weeks supply left and will need medication to be reordered. Routed to provider to review and sign.       Katty Bishop, RNCC  Neurology/Neurosurgery

## 2023-08-22 RX ORDER — DULOXETIN HYDROCHLORIDE 30 MG/1
30 CAPSULE, DELAYED RELEASE ORAL AT BEDTIME
Qty: 90 CAPSULE | Refills: 1 | Status: SHIPPED | OUTPATIENT
Start: 2023-08-22 | End: 2024-02-16

## 2023-09-26 DIAGNOSIS — G43.009 MIGRAINE WITHOUT AURA AND WITHOUT STATUS MIGRAINOSUS, NOT INTRACTABLE: ICD-10-CM

## 2023-09-26 RX ORDER — TOPIRAMATE 50 MG/1
TABLET, FILM COATED ORAL
Qty: 180 TABLET | Refills: 1 | Status: SHIPPED | OUTPATIENT
Start: 2023-09-26 | End: 2024-02-09

## 2023-09-26 NOTE — TELEPHONE ENCOUNTER
Pending Prescriptions:                       Disp   Refills    topiramate (TOPAMAX) 50 MG tablet         90 tab*2            Sig: Take 1 tab (50mg) in the morning and 1 tab (50mg)           in the evening       Requested Pharmacy: [facility]    Pt's last office visit: 06/29/2023  Next scheduled office visit: 12/05/2023    Per the RN/LPN medication refill protocol, writer is unable to refill this request.

## 2023-09-26 NOTE — TELEPHONE ENCOUNTER
RN unable to authorize this refill request per the medication refill protocol.         Routed to the provider to review.    Breanna Hawkins RN Care Coordinator   Neurology/Neurosurgery/PM&R/ Pain Management

## 2023-10-21 ENCOUNTER — HEALTH MAINTENANCE LETTER (OUTPATIENT)
Age: 43
End: 2023-10-21

## 2023-11-20 DIAGNOSIS — G43.009 MIGRAINE WITHOUT AURA AND WITHOUT STATUS MIGRAINOSUS, NOT INTRACTABLE: ICD-10-CM

## 2023-11-20 RX ORDER — METHYLPREDNISOLONE 4 MG
TABLET, DOSE PACK ORAL
Qty: 21 TABLET | Refills: 0 | Status: SHIPPED | OUTPATIENT
Start: 2023-11-20 | End: 2024-02-16

## 2023-11-20 RX ORDER — METHYLPREDNISOLONE 4 MG
TABLET, DOSE PACK ORAL
Qty: 21 TABLET | Refills: 0 | Status: CANCELLED | OUTPATIENT
Start: 2023-11-20

## 2023-11-20 NOTE — TELEPHONE ENCOUNTER
Pending Prescriptions:                       Disp   Refills    methylPREDNISolone (MEDROL DOSEPAK) 4 MG *21 tab*0            Sig: Follow Package Directions        Office visit - 6/26/2023  Scheduled - 12/5/2023

## 2023-11-21 NOTE — TELEPHONE ENCOUNTER
Pt sent myChart.     Dr. Monk sent in medrol dose pack yesterday.     Rebecca SHETTY RN, BSN  [facility] Neurology

## 2023-12-04 ENCOUNTER — TELEPHONE (OUTPATIENT)
Dept: NEUROLOGY | Facility: CLINIC | Age: 43
End: 2023-12-04
Payer: COMMERCIAL

## 2023-12-04 NOTE — PROGRESS NOTES
"[facility]/[place]  Section of General Neurology  Return Patient  Virtual Visit    Hannah Carl MRN# [id number]   Age: 43 year old YOB: 1980              Assessment and Plan:   Assessment:  Angela Carl is a pleasant 43 year old female who is seen in follow up primarily for migraine headaches.  She thinks they are in a reasonably good place.  For more diffuse pains she is on a higher dose of lyrica now.  Cymbalta--previously didn't tolerate higher doses.  Topiramate increasing may have given her brain fog, of note.  Overall we will continue a trial and error approach.  I do think we have made good progress and her quality of life remains improved.  Medrol dose packs have helped during bad stretches previously.  Discussed future considerations.   All questions answered.     Plan:  Trial increasing topiramate to 75 at bedtime  Continue lyrica 100 mg BID, cymbalta 30 mg for now  Maxalt--continue PRN, remains mostly helpful  Follow up in ~6 months, to reach out sooner with any issues questions or changes        Ángel Monk MD   of Neurology   [facility]      Interval history:     Neuropsychological testing cancelled--concern for insurance issues.    Migraines overall modestly controlled.  Medrol dose pack---did help when we trialed this.    Bubblers- drink with green tea may have set them off.   Primary increased her lyrica to 100 mg Bid for body aches which did help.   Discussed 50- as an option too.   Tremors manifest at times--feels like body is shaking at times.   Tried to go back up to 50 mg BID of topamax, felt too non functional/word finding harder, doing better at 50 mg at night   Trialing 75 mg at bedtime discussed  Felt \"flat' on higher doses of cymbalta.    Never been on effexor   Migraines worsen with barometric pressure changes.   Maxalt PRN helps most of the time.      A/p at previous visit  Angela Carl is a pleasant 43 " year old female who is seen in follow up for migraine headaches as well as diffuse symptoms with previously normal work up felt to likely represent fibromyalgia.  Regarding her migraines today--these overall remain much improved.  Has good and bad months.  Given some recent changes she has re increased her topamax to 50 mg BID.  Cymbalta now down to 30 mg daily as felt she was emotionally blunted at higher doses.  Now on lyrica for diffuse pains which is helpful.  Memory loss remains a concern as is word finding difficulty.  She has a FH in her grandmother, several great aunts, she is suspcious her mother is showing signs/symptoms.  We discussed ways to further work this up as below given it's persistence.       Plan:  For migraine headaches: will continue topamax 50 mg BID, cymbalta 30 mg daily, maxalt PRN.    For memory: optimize mood sleep, headache control  Blood work I typically recommend: Vitamin b12, b1, RPR, HIV, TSH--can get locally   Also discussed Alzheimers specific blood work, newer, in AVS, she could see if this might be covered, has a good negative predictive value.   Speech therapy--locally, will coordinate  MRI brain w/o--locally, will coordinate  Neuropsychological testing to help us further understands risk/benefit of memory medications in her case and for further diagnostic clarity.  Mood and pain changes are independent memory risk factors as above but given FH and how limiting it is quite reasonable to explore further.   Genetics given FH, order placed  To see me back to review neuropsych testing, to reach out soon with issues questions or changes       Past Medical History:   There is no problem list on file for this patient.    No past medical history on file.     Past Surgical History:   No past surgical history on file.     Social History:         Family History:   No family history on file.     Medications:     Current Outpatient Medications   Medication Sig    cetirizine (ZYRTEC) 10 MG  tablet Take 1 tablet daily    clindamycin (CLINDAMAX) 1 % external gel     DULoxetine (CYMBALTA) 30 MG capsule Take 1 capsule (30 mg) by mouth At Bedtime    levothyroxine (SYNTHROID/LEVOTHROID) 112 MCG tablet Take 112 mcg by mouth    loratadine (CLARITIN REDITABS) 10 MG ODT Take 10 mg by mouth    methylPREDNISolone (MEDROL DOSEPAK) 4 MG tablet therapy pack Follow Package Directions    montelukast (SINGULAIR) 10 MG tablet Take 1 tablet by mouth every morning    naproxen (NAPROSYN) 500 MG tablet TAKE 1 TABLET BY MOUTH TWICE DAILY AS NEEDED FOR HEADACHE    pregabalin (LYRICA) 50 MG capsule TAKE 1 CAPSULE BY MOUTH ONCE DAILY IN THE EVENING    rizatriptan (MAXALT) 10 MG tablet TAKE 1 TABLET BY MOUTH AT ONSET OF HEADACHE    SAXENDA 18 MG/3ML pen INJECT 0.6MG SUBCUTANEOUSLY IN THE MORNING FOR 7 DAYS, THEN INCREASE TO 1.2MG FOR 7 DAYS, THEN 1.8MG FOR 7 DAYS, THEN 2.4MG DURING WEEK 4    tiZANidine (ZANAFLEX) 4 MG tablet     topiramate (TOPAMAX) 50 MG tablet Take 1 tab (50mg) in the morning and 1 tab (50mg) in the evening    triamcinolone (NASACORT) 55 MCG/ACT nasal aerosol Spray 1 spray in nostril     No current facility-administered medications for this visit.        Allergies:     Allergies   Allergen Reactions    Fluticasone Rash    Latex Itching and Rash    Hydrocodone Headache and Other (See Comments)     Migraines          Review of Systems:   As noted above     Physical Exam:   General: Seated comfortably in no acute distress.  Neurologic:     Mental Status: Fully alert, attentive and oriented. Speech clear and fluent, no paraphasic errors.     Cranial Nerves: Facial movements symmetric. Hearing not formally tested but intact to conversation.  No dysarthria.     Motor: No tremors or other abnormal movements observed.      Sensory:Not able to be tested virtually           Data: Pertinent prior to visit   Imaging:  *MR BRAIN wo CONTRAST   EXAM: MR BRAIN wo CONTRAST   LOCATION: [facility]   DATE: 7/12/2023      INDICATION: Headache, chronic, no new features. Migraines. Memory   changes with family history of dementia.   COMPARISON: MRI dated 10/19/2020.   TECHNIQUE: Routine multiplanar multisequence head MRI without   intravenous contrast.     FINDINGS:   INTRACRANIAL CONTENTS: No acute/subacute infarct, acute hemorrhage,   extra-axial fluid collection, or mass. Unchanged focus of T2   prolongation within the subcortical white matter of the left superior   frontal gyrus, nonspecific and may be related to chronic migraine   disorder, demyelination, prior trauma, among other etiologies. No new   abnormal brain parenchymal signal intensity. Normal ventricles and   sulci for age. Normal position of the cerebellar tonsils.     SELLA: Within technique limitations, no gross abnormality.     OSSEOUS STRUCTURES/SOFT TISSUES: Normal marrow signal. The major   intracranial vascular flow voids are maintained.     ORBITS: Within technique limitations, no significant abnormality.     SINUSES/MASTOIDS: No significant paranasal sinus or mastoid mucosal   disease.      IMPRESSION:   1. Unchanged exam without an acute intracranial abnormality.                       The total time of this encounter today amounted to 30 minutes in total. This time included time spent with the patient on video, prep work/reviewing previous documentation and performing post visit documentation.

## 2023-12-05 ENCOUNTER — VIRTUAL VISIT (OUTPATIENT)
Dept: NEUROLOGY | Facility: CLINIC | Age: 43
End: 2023-12-05
Payer: COMMERCIAL

## 2023-12-05 DIAGNOSIS — G43.009 MIGRAINE WITHOUT AURA AND WITHOUT STATUS MIGRAINOSUS, NOT INTRACTABLE: Primary | ICD-10-CM

## 2023-12-05 PROCEDURE — 99214 OFFICE O/P EST MOD 30 MIN: CPT | Mod: VID | Performed by: STUDENT IN AN ORGANIZED HEALTH CARE EDUCATION/TRAINING PROGRAM

## 2023-12-05 NOTE — PROGRESS NOTES
Angela is a 43 year old who is being evaluated via a billable video visit.      How would you like to obtain your AVS? [email or web address]  If the video visit is dropped, the invitation should be resent by: Text to cell phone: [phone]  Will anyone else be joining your video visit? No        Video-Visit Details    Type of service:  Video Visit     Originating Location (pt. Location): Home    Distant Location (provider location):  On-site  Platform used for Video Visit: [employer]  Video data: 10:32-10:48 timing on video itself

## 2023-12-05 NOTE — LETTER
12/5/2023         RE: Hannah Carl  [address] [address] [address]        Dear Colleague,    Thank you for referring your patient, Hannah Carl, to the [facility]. Please see a copy of my visit note below.    [facility]  Section of General Neurology  Return Patient  Virtual Visit    Hannah Carl MRN# [id number]   Age: 43 year old YOB: 1980              Assessment and Plan:   Assessment:  Angela Carl is a pleasant 43 year old female who is seen in follow up primarily for migraine headaches.  She thinks they are in a reasonably good place.  For more diffuse pains she is on a higher dose of lyrica now.  Cymbalta--previously didn't tolerate higher doses.  Topiramate increasing may have given her brain fog, of note.  Overall we will continue a trial and error approach.  I do think we have made good progress and her quality of life remains improved.  Medrol dose packs have helped during bad stretches previously.  Discussed future considerations.   All questions answered.     Plan:  Trial increasing topiramate to 75 at bedtime  Continue lyrica 100 mg BID, cymbalta 30 mg for now  Maxalt--continue PRN, remains mostly helpful  Follow up in ~6 months, to reach out sooner with any issues questions or changes        Ángel Monk MD   of Neurology   [facility]      Interval history:     Neuropsychological testing cancelled--concern for insurance issues.    Migraines overall modestly controlled.  Medrol dose pack---did help when we trialed this.    Bubblers- drink with green tea may have set them off.   Primary increased her lyrica to 100 mg Bid for body aches which did help.   Discussed 50- as an option too.   Tremors manifest at times--feels like body is shaking at times.   Tried to go back up to 50 mg BID of topamax, felt too non functional/word finding harder, doing  "better at 50 mg at night   Trialing 75 mg at bedtime discussed  Felt \"flat' on higher doses of cymbalta.    Never been on effexor   Migraines worsen with barometric pressure changes.   Maxalt PRN helps most of the time.      A/p at previous visit  Angela Carl is a pleasant 43 year old female who is seen in follow up for migraine headaches as well as diffuse symptoms with previously normal work up felt to likely represent fibromyalgia.  Regarding her migraines today--these overall remain much improved.  Has good and bad months.  Given some recent changes she has re increased her topamax to 50 mg BID.  Cymbalta now down to 30 mg daily as felt she was emotionally blunted at higher doses.  Now on lyrica for diffuse pains which is helpful.  Memory loss remains a concern as is word finding difficulty.  She has a FH in her grandmother, several great aunts, she is suspcious her mother is showing signs/symptoms.  We discussed ways to further work this up as below given it's persistence.       Plan:  For migraine headaches: will continue topamax 50 mg BID, cymbalta 30 mg daily, maxalt PRN.    For memory: optimize mood sleep, headache control  Blood work I typically recommend: Vitamin b12, b1, RPR, HIV, TSH--can get locally   Also discussed Alzheimers specific blood work, newer, in AVS, she could see if this might be covered, has a good negative predictive value.   Speech therapy--locally, will coordinate  MRI brain w/o--locally, will coordinate  Neuropsychological testing to help us further understands risk/benefit of memory medications in her case and for further diagnostic clarity.  Mood and pain changes are independent memory risk factors as above but given FH and how limiting it is quite reasonable to explore further.   Genetics given FH, order placed  To see me back to review neuropsych testing, to reach out soon with issues questions or changes       Past Medical History:   There is no problem list on file for this " patient.    No past medical history on file.     Past Surgical History:   No past surgical history on file.     Social History:         Family History:   No family history on file.     Medications:     Current Outpatient Medications   Medication Sig     cetirizine (ZYRTEC) 10 MG tablet Take 1 tablet daily     clindamycin (CLINDAMAX) 1 % external gel      DULoxetine (CYMBALTA) 30 MG capsule Take 1 capsule (30 mg) by mouth At Bedtime     levothyroxine (SYNTHROID/LEVOTHROID) 112 MCG tablet Take 112 mcg by mouth     loratadine (CLARITIN REDITABS) 10 MG ODT Take 10 mg by mouth     methylPREDNISolone (MEDROL DOSEPAK) 4 MG tablet therapy pack Follow Package Directions     montelukast (SINGULAIR) 10 MG tablet Take 1 tablet by mouth every morning     naproxen (NAPROSYN) 500 MG tablet TAKE 1 TABLET BY MOUTH TWICE DAILY AS NEEDED FOR HEADACHE     pregabalin (LYRICA) 50 MG capsule TAKE 1 CAPSULE BY MOUTH ONCE DAILY IN THE EVENING     rizatriptan (MAXALT) 10 MG tablet TAKE 1 TABLET BY MOUTH AT ONSET OF HEADACHE     SAXENDA 18 MG/3ML pen INJECT 0.6MG SUBCUTANEOUSLY IN THE MORNING FOR 7 DAYS, THEN INCREASE TO 1.2MG FOR 7 DAYS, THEN 1.8MG FOR 7 DAYS, THEN 2.4MG DURING WEEK 4     tiZANidine (ZANAFLEX) 4 MG tablet      topiramate (TOPAMAX) 50 MG tablet Take 1 tab (50mg) in the morning and 1 tab (50mg) in the evening     triamcinolone (NASACORT) 55 MCG/ACT nasal aerosol Spray 1 spray in nostril     No current facility-administered medications for this visit.        Allergies:     Allergies   Allergen Reactions     Fluticasone Rash     Latex Itching and Rash     Hydrocodone Headache and Other (See Comments)     Migraines          Review of Systems:   As noted above     Physical Exam:   General: Seated comfortably in no acute distress.  Neurologic:     Mental Status: Fully alert, attentive and oriented. Speech clear and fluent, no paraphasic errors.     Cranial Nerves: Facial movements symmetric. Hearing not formally tested but intact  to conversation.  No dysarthria.     Motor: No tremors or other abnormal movements observed.      Sensory:Not able to be tested virtually           Data: Pertinent prior to visit   Imaging:  *MR BRAIN wo CONTRAST   EXAM: MR BRAIN wo CONTRAST   LOCATION: [facility]   DATE: 7/12/2023     INDICATION: Headache, chronic, no new features. Migraines. Memory   changes with family history of dementia.   COMPARISON: MRI dated 10/19/2020.   TECHNIQUE: Routine multiplanar multisequence head MRI without   intravenous contrast.     FINDINGS:   INTRACRANIAL CONTENTS: No acute/subacute infarct, acute hemorrhage,   extra-axial fluid collection, or mass. Unchanged focus of T2   prolongation within the subcortical white matter of the left superior   frontal gyrus, nonspecific and may be related to chronic migraine   disorder, demyelination, prior trauma, among other etiologies. No new   abnormal brain parenchymal signal intensity. Normal ventricles and   sulci for age. Normal position of the cerebellar tonsils.     SELLA: Within technique limitations, no gross abnormality.     OSSEOUS STRUCTURES/SOFT TISSUES: Normal marrow signal. The major   intracranial vascular flow voids are maintained.     ORBITS: Within technique limitations, no significant abnormality.     SINUSES/MASTOIDS: No significant paranasal sinus or mastoid mucosal   disease.      IMPRESSION:   1. Unchanged exam without an acute intracranial abnormality.                       The total time of this encounter today amounted to 30 minutes in total. This time included time spent with the patient on video, prep work/reviewing previous documentation and performing post visit documentation.      Angela is a 43 year old who is being evaluated via a billable video visit.      How would you like to obtain your AVS? MyChart  If the video visit is dropped, the invitation should be resent by: Text to cell phone: [phone]  Will anyone else be joining your video  visit? No  {If patient encounters technical issues they should call [phone] :[id number]}      Video-Visit Details    Type of service:  Video Visit     Originating Location (pt. Location): Home  {PROVIDER LOCATION On-site should be selected for visits conducted from your clinic location or adjoining [facility] hospital, academic office, or other nearby [facility] building. Off-site should be selected for all other provider locations, including home:[id number]}  Distant Location (provider location):  On-site  Platform used for Video Visit: [employer]  Video data: 10:32-10:48 timing on video itself      Again, thank you for allowing me to participate in the care of your patient.        Sincerely,        Delmar Monk MD

## 2023-12-05 NOTE — PATIENT INSTRUCTIONS
Trial increasing topiramate to 75 at bedtime  Continue lyrica 100 mg BID, cymbalta 30 mg for now  Maxalt--continue PRN

## 2024-02-06 DIAGNOSIS — G43.009 MIGRAINE WITHOUT AURA AND WITHOUT STATUS MIGRAINOSUS, NOT INTRACTABLE: Primary | ICD-10-CM

## 2024-02-06 RX ORDER — METHYLPREDNISOLONE 4 MG
TABLET, DOSE PACK ORAL
Qty: 21 TABLET | Refills: 0 | Status: SHIPPED | OUTPATIENT
Start: 2024-02-06 | End: 2024-02-16

## 2024-02-09 ENCOUNTER — TELEPHONE (OUTPATIENT)
Dept: NEUROLOGY | Facility: CLINIC | Age: 44
End: 2024-02-09
Payer: COMMERCIAL

## 2024-02-09 DIAGNOSIS — G43.009 MIGRAINE WITHOUT AURA AND WITHOUT STATUS MIGRAINOSUS, NOT INTRACTABLE: Primary | ICD-10-CM

## 2024-02-09 RX ORDER — TOPIRAMATE 50 MG/1
TABLET, FILM COATED ORAL
Qty: 270 TABLET | Refills: 1 | Status: SHIPPED | OUTPATIENT
Start: 2024-02-09

## 2024-02-09 NOTE — TELEPHONE ENCOUNTER
Retail Pharmacy Prior Authorization Team   Phone: [phone]        Routing to the provider has the prescriber entered 87 tablets for the medication.  Did he really mean to send the Rx for 87 tablets?

## 2024-02-09 NOTE — TELEPHONE ENCOUNTER
----- Message from Delmar Monk MD sent at 2/9/2024  7:33 AM CST -----  Can we add this patient as a virtual return on 2/15 @130 PM virtual/confirm this works for her?    Thanks,     -Ángel

## 2024-02-14 ENCOUNTER — TELEPHONE (OUTPATIENT)
Dept: NEUROLOGY | Facility: CLINIC | Age: 44
End: 2024-02-14
Payer: COMMERCIAL

## 2024-02-14 NOTE — TELEPHONE ENCOUNTER
MARK with patient offering to switch her 1:30 PM appt with Dr. Monk tomorrow to a video visit per Dr. Monk's request if she would appreciate that to save her the drive. Gave her the call back number [phone] to switch to video format if she so prefers.    Kaleb Ramirez  Visit Facilitator  [facility]

## 2024-02-14 NOTE — TELEPHONE ENCOUNTER
[facility] Call Center    Phone Message    May a detailed message be left on voicemail: yes     Reason for Call:  Patient would like to speak to care team before deciding on video or in clinic. Patient can be reached any time today.     Action Taken: Message routed to:  Adult Clinics: Neurology p [phone]    Travel Screening: Not Applicable

## 2024-02-15 ENCOUNTER — OFFICE VISIT (OUTPATIENT)
Dept: NEUROLOGY | Facility: CLINIC | Age: 44
End: 2024-02-15
Payer: COMMERCIAL

## 2024-02-15 VITALS — HEART RATE: 79 BPM | SYSTOLIC BLOOD PRESSURE: 126 MMHG | DIASTOLIC BLOOD PRESSURE: 79 MMHG

## 2024-02-15 DIAGNOSIS — M79.7 FIBROMYALGIA: ICD-10-CM

## 2024-02-15 DIAGNOSIS — G43.009 MIGRAINE WITHOUT AURA AND WITHOUT STATUS MIGRAINOSUS, NOT INTRACTABLE: Primary | ICD-10-CM

## 2024-02-15 PROCEDURE — 99215 OFFICE O/P EST HI 40 MIN: CPT | Performed by: STUDENT IN AN ORGANIZED HEALTH CARE EDUCATION/TRAINING PROGRAM

## 2024-02-15 NOTE — LETTER
2/15/2024         RE: Hannah Carl  [id number] [address] [address]        Dear Colleague,    Thank you for referring your patient, Hannah Carl, to the [facility] [place]. Please see a copy of my visit note below.    [facility]/[place]  Section of General Neurology  Return Patient Visit    Hannah Carl MRN# [id number]   Age: 43 year old YOB: 1980            Assessment and Plan:   Assessment:  Angela Carl is a pleasant 43 year old female who is seen in follow up primarily for migraine headaches.   She has had issues with dizziness, tingling, facial sensory changes in the last little while.  Dizziness was evaluated locally and started vestibular PT with variable success.  She in fact thinks maxalt is the most helpful and we discussed that migrainous etiologies are pretty common.  She has normal imaging of her brain from 2023 and we discussed that I think repeating imaging would be rather low yield.  If neck symptoms progress substantially with this area of her neuroaxis never checked we could consider this pending her progression.  I see no clear red flags to history or exam today however.  It is possible her abrupt cymbalta cessation is a variable as well.  Discussed that I find it's cousin drug effexor better for migraine management as an option.  She has not gone higher on her topamax or started Ubrelvy of yet and I think trialing these previous recommendations would be a good idea too.  It will continue to be a trial and error process.       Plan:  Lyrica--100 mg BID  Topamax --100 at bedtime --Next option (our previous plan ())  Trial Ubrelvy if it doesn't help go back to Maxalt  Next daily option discussed---Effexor   Other options:   I do think she has room on lyrica or topiramate (as ordered/above)   Has existing follow up in early June, will keep this appointment    Ángel Monk MD   of  Neurology   [facility]      Interval history:     Symptoms started 3 weeks ago.   Tingling weird feelings in face before stopped it.    Off cymbalta--tapered over a week.   Dizzy  Nausea  Scalp tingling  Discussed with recently negative MRI brain would think this would be lower yield.  Nothing to exam to suggest C spine pathology if neck symptoms specifically worsen could consider this but thought to be lower yield.  C spine pathology would not relate to scalp or facial symptoms, which are common in those with migraine headaches and fibromyalgia.      Did vestibular PT twice   No + kinza hallpike, treatment helped a bit though  Maxalt did help relieve it once.    Cymbalta wasn't helping, flat, wasn't helping.  No emotions.    Ubrelvy ---hasn't trialed yet.  'Maxalt helping implies it is migrainous.      Medrol dose pack likely helped a bit    Lyrica--100 mg BID  Topamax --100 at bedtime       A/P at previous visit  Angela Carl is a pleasant 43 year old female who is seen in follow up primarily for migraine headaches.  She thinks they are in a reasonably good place.  For more diffuse pains she is on a higher dose of lyrica now.  Cymbalta--previously didn't tolerate higher doses.  Topiramate increasing may have given her brain fog, of note.  Overall we will continue a trial and error approach.  I do think we have made good progress and her quality of life remains improved.  Medrol dose packs have helped during bad stretches previously.  Discussed future considerations.   All questions answered.     Plan:  Trial increasing topiramate to 75 at bedtime  Continue lyrica 100 mg BID, cymbalta 30 mg for now  Maxalt--continue PRN, remains mostly helpful  Follow up in ~6 months, to reach out sooner with any issues questions or changes      Past Medical History:   There is no problem list on file for this patient.    No past medical history on file.     Past Surgical History:   No past surgical history  on file.     Social History:         Family History:   No family history on file.     Medications:     Current Outpatient Medications   Medication Sig     cetirizine (ZYRTEC) 10 MG tablet Take 1 tablet daily     clindamycin (CLINDAMAX) 1 % external gel      DULoxetine (CYMBALTA) 30 MG capsule Take 1 capsule (30 mg) by mouth At Bedtime     levothyroxine (SYNTHROID/LEVOTHROID) 112 MCG tablet Take 112 mcg by mouth     loratadine (CLARITIN REDITABS) 10 MG ODT Take 10 mg by mouth     methylPREDNISolone (MEDROL DOSEPAK) 4 MG tablet therapy pack Follow Package Directions     methylPREDNISolone (MEDROL DOSEPAK) 4 MG tablet therapy pack Follow Package Directions     montelukast (SINGULAIR) 10 MG tablet Take 1 tablet by mouth every morning     naproxen (NAPROSYN) 500 MG tablet TAKE 1 TABLET BY MOUTH TWICE DAILY AS NEEDED FOR HEADACHE     pregabalin (LYRICA) 50 MG capsule TAKE 1 CAPSULE BY MOUTH ONCE DAILY IN THE EVENING     rizatriptan (MAXALT) 10 MG tablet TAKE 1 TABLET BY MOUTH AT ONSET OF HEADACHE     tiZANidine (ZANAFLEX) 4 MG tablet      topiramate (TOPAMAX) 50 MG tablet Take 1 tab (50mg) in the morning and 2 tabs (100mg) in the evening     triamcinolone (NASACORT) 55 MCG/ACT nasal aerosol Spray 1 spray in nostril     ubrogepant (UBRELVY) 100 MG tablet Take 1 tablet (100 mg) by mouth at onset of headache     No current facility-administered medications for this visit.        Allergies:     Allergies   Allergen Reactions     Fluticasone Rash     Latex Itching and Rash     Hydrocodone Headache and Other (See Comments)     Migraines            Physical Exam:   Vitals: /79   Pulse 79      Neuro:   General Appearance: No apparent distress, well-nourished, well-groomed, pleasant     Mental Status: Alert and oriented to person, place, and time. Speech fluent and comprehension intact. No dysarthria.   Head impulse negative      Cranial Nerves:   II: Visual fields: normal  III: Pupils: 3 mm, equal, round, reactive to light    III,IV,VI: Extraocular Movements: intact, no nystagmus   V: Facial sensation: intact to light touch  VII: Facial strength: intact without asymmetry       Motor Exam:   5/5 diffusely     Sensory: intact to light touch    Reflexes: biceps, triceps, brachioradialis, patellar, and ankle jerks 2+ and symmetric.        Data: Pertinent prior to visit   maging:  *MR BRAIN wo CONTRAST   EXAM: MR BRAIN wo CONTRAST   LOCATION: [facility]   DATE: 7/12/2023     INDICATION: Headache, chronic, no new features. Migraines. Memory   changes with family history of dementia.   COMPARISON: MRI dated 10/19/2020.   TECHNIQUE: Routine multiplanar multisequence head MRI without   intravenous contrast.     FINDINGS:   INTRACRANIAL CONTENTS: No acute/subacute infarct, acute hemorrhage,   extra-axial fluid collection, or mass. Unchanged focus of T2   prolongation within the subcortical white matter of the left superior   frontal gyrus, nonspecific and may be related to chronic migraine   disorder, demyelination, prior trauma, among other etiologies. No new   abnormal brain parenchymal signal intensity. Normal ventricles and   sulci for age. Normal position of the cerebellar tonsils.     SELLA: Within technique limitations, no gross abnormality.     OSSEOUS STRUCTURES/SOFT TISSUES: Normal marrow signal. The major   intracranial vascular flow voids are maintained.     ORBITS: Within technique limitations, no significant abnormality.     SINUSES/MASTOIDS: No significant paranasal sinus or mastoid mucosal   disease.      IMPRESSION:   1. Unchanged exam without an acute intracranial abnormality.           Exam Description: *MR THORACIC wo CONTRAST    Exam Code: [email or web address]/      Clinical History: Right chest wall pain radiating to T-6 through 8.        Technique: Multiplanar multisequence thoracic spine MRI without    contrast.        Comparison:  Plain films of 4/21/2017        Findings: Alignment and spinal cord are unremarkable.   No fracture.        C7-T1 through T12-L1:  No significant spinal canal or neural    foraminal stenosis.        Impression: Unremarkable thoracic spine MRI.     *MR LUMBAR SPINE wo CONTRAST   EXAM: MR LUMBAR SPINE wo CONTRAST   LOCATION: [facility]   DATE/TIME: 7/28/2021 9:25 AM     INDICATION: Low back pain, > 6 wks. Lumbar radiculopathy with   right-sided weakness. Lumbar radiculopathy. Right leg weakness.    COMPARISON: CTA of the abdomen and pelvis 06/16/2021.   TECHNIQUE: Routine Lumbar Spine MRI without IV contrast.     FINDINGS:   Nomenclature is based on 5 lumbar type vertebral bodies. Anatomic   alignment. Vertebral body height is preserved. There is no marrow or   ligamentous edema. No pars defect. Normal marrow signal. Normal   distal spinal cord and cauda equina with conus medullaris at inferior   L1. 7 mm cystic structure in the left parapelvic region may reflect a   simple-appearing extrarenal parapelvic cyst. This is stable to the   previous CT study. Unremarkable visualized bony pelvis.     T12-L1: Normal disc height and signal. No herniation. Normal facets.   No spinal canal or neural foraminal stenosis.     L1-L2: Normal disc height and signal. No herniation. Normal facets.   No spinal canal or neural foraminal stenosis.     L2-L3: Normal disc height and signal. No herniation. Normal facets.   No spinal canal or neural foraminal stenosis.     L3-L4: Slight loss of disc signal with normal disc height. Minimal   annular bulge. Central canal and neural foramina are patent. Normal   facets.     L4-L5: Normal disc height and disc signal. The central canal and   foramina are patent. Mild facet arthropathy with small bilateral   facet synovial effusions. Minimal annular bulge flattens the ventral   thecal sac.     L5-S1: Normal disc height and signal. No herniation. Normal facets.   No spinal canal or neural foraminal stenosis.     IMPRESSION:   1. Mild degenerative changes, detailed above, but  without definite   explanation for right leg weakness.        The total time of this encounter today amounted to 40 minutes. This time included time spent with the patient, prep work, ordering tests, and performing post visit documentation.    The longitudinal plan of care for migraine headaches was addressed during this visit. Due to the added complexity in care, I will continue to support Ms. Carl in the subsequent management of this condition(s) and with the ongoing continuity of care of this condition(s).      Again, thank you for allowing me to participate in the care of your patient.        Sincerely,        Delmar Monk MD

## 2024-02-15 NOTE — NURSING NOTE
Hannah aCrl is a 43 year old female who presents for:  Chief Complaint   Patient presents with    RECHECK     Cheeks and neck are tingling bilat and she is getting dizzy        Initial Vitals:  /79   Pulse 79  There is no height or weight on file to calculate BMI.. There is no height or weight on file to calculate BSA. BP completed using cuff size: regular    Kaleb Ramirez

## 2024-02-15 NOTE — PATIENT INSTRUCTIONS
Lyrica--100 mg BID  Topamax --100 at bedtime --Next option (doing my previous plan ()   Trial Ubrelvy if it doesn't help go back to Maxalt    Next daily---Effexor (cymbalta cousin)  Other options:   I do think you have room on lyrica or topiramate (as ordered/above)     MRI c spine if neck symptoms persist.

## 2024-02-15 NOTE — PROGRESS NOTES
[facility]/[place]  Section of General Neurology  Return Patient Visit    Hannah Carl MRN# [id number]   Age: 43 year old YOB: 1980            Assessment and Plan:   Assessment:  Angela Carl is a pleasant 43 year old female who is seen in follow up primarily for migraine headaches.   She has had issues with dizziness, tingling, facial sensory changes in the last little while.  Dizziness was evaluated locally and started vestibular PT with variable success.  She in fact thinks maxalt is the most helpful and we discussed that migrainous etiologies are pretty common.  She has normal imaging of her brain from 2023 and we discussed that I think repeating imaging would be rather low yield.  If neck symptoms progress substantially with this area of her neuroaxis never checked we could consider this pending her progression.  I see no clear red flags to history or exam today however.  It is possible her abrupt cymbalta cessation is a variable as well.  Discussed that I find it's cousin drug effexor better for migraine management as an option.  She has not gone higher on her topamax or started Ubrelvy of yet and I think trialing these previous recommendations would be a good idea too.  It will continue to be a trial and error process.       Plan:  Lyrica--100 mg BID  Topamax --100 at bedtime --Next option (our previous plan ())  Trial Ubrelvy if it doesn't help go back to Maxalt  Next daily option discussed---Effexor   Other options:   I do think she has room on lyrica or topiramate (as ordered/above)   Has existing follow up in early June, will keep this appointment    Ángel Monk MD   of Neurology   [facility]      Interval history:     Symptoms started 3 weeks ago.   Tingling weird feelings in face before stopped it.    Off cymbalta--tapered over a week.   Dizzy  Nausea  Scalp tingling  Discussed with recently negative MRI brain would  think this would be lower yield.  Nothing to exam to suggest C spine pathology if neck symptoms specifically worsen could consider this but thought to be lower yield.  C spine pathology would not relate to scalp or facial symptoms, which are common in those with migraine headaches and fibromyalgia.      Did vestibular PT twice   No + kinza hallpike, treatment helped a bit though  Maxalt did help relieve it once.    Cymbalta wasn't helping, flat, wasn't helping.  No emotions.    Ubrelvy ---hasn't trialed yet.  'Maxalt helping implies it is migrainous.      Medrol dose pack likely helped a bit    Lyrica--100 mg BID  Topamax --100 at bedtime       A/P at previous visit  Angela Carl is a pleasant 43 year old female who is seen in follow up primarily for migraine headaches.  She thinks they are in a reasonably good place.  For more diffuse pains she is on a higher dose of lyrica now.  Cymbalta--previously didn't tolerate higher doses.  Topiramate increasing may have given her brain fog, of note.  Overall we will continue a trial and error approach.  I do think we have made good progress and her quality of life remains improved.  Medrol dose packs have helped during bad stretches previously.  Discussed future considerations.   All questions answered.     Plan:  Trial increasing topiramate to 75 at bedtime  Continue lyrica 100 mg BID, cymbalta 30 mg for now  Maxalt--continue PRN, remains mostly helpful  Follow up in ~6 months, to reach out sooner with any issues questions or changes      Past Medical History:   There is no problem list on file for this patient.    No past medical history on file.     Past Surgical History:   No past surgical history on file.     Social History:         Family History:   No family history on file.     Medications:     Current Outpatient Medications   Medication Sig    cetirizine (ZYRTEC) 10 MG tablet Take 1 tablet daily    clindamycin (CLINDAMAX) 1 % external gel     DULoxetine (CYMBALTA) 30  MG capsule Take 1 capsule (30 mg) by mouth At Bedtime    levothyroxine (SYNTHROID/LEVOTHROID) 112 MCG tablet Take 112 mcg by mouth    loratadine (CLARITIN REDITABS) 10 MG ODT Take 10 mg by mouth    methylPREDNISolone (MEDROL DOSEPAK) 4 MG tablet therapy pack Follow Package Directions    methylPREDNISolone (MEDROL DOSEPAK) 4 MG tablet therapy pack Follow Package Directions    montelukast (SINGULAIR) 10 MG tablet Take 1 tablet by mouth every morning    naproxen (NAPROSYN) 500 MG tablet TAKE 1 TABLET BY MOUTH TWICE DAILY AS NEEDED FOR HEADACHE    pregabalin (LYRICA) 50 MG capsule TAKE 1 CAPSULE BY MOUTH ONCE DAILY IN THE EVENING    rizatriptan (MAXALT) 10 MG tablet TAKE 1 TABLET BY MOUTH AT ONSET OF HEADACHE    tiZANidine (ZANAFLEX) 4 MG tablet     topiramate (TOPAMAX) 50 MG tablet Take 1 tab (50mg) in the morning and 2 tabs (100mg) in the evening    triamcinolone (NASACORT) 55 MCG/ACT nasal aerosol Spray 1 spray in nostril    ubrogepant (UBRELVY) 100 MG tablet Take 1 tablet (100 mg) by mouth at onset of headache     No current facility-administered medications for this visit.        Allergies:     Allergies   Allergen Reactions    Fluticasone Rash    Latex Itching and Rash    Hydrocodone Headache and Other (See Comments)     Migraines            Physical Exam:   Vitals: /79   Pulse 79      Neuro:   General Appearance: No apparent distress, well-nourished, well-groomed, pleasant     Mental Status: Alert and oriented to person, place, and time. Speech fluent and comprehension intact. No dysarthria.   Head impulse negative      Cranial Nerves:   II: Visual fields: normal  III: Pupils: 3 mm, equal, round, reactive to light   III,IV,VI: Extraocular Movements: intact, no nystagmus   V: Facial sensation: intact to light touch  VII: Facial strength: intact without asymmetry       Motor Exam:   5/5 diffusely     Sensory: intact to light touch    Reflexes: biceps, triceps, brachioradialis, patellar, and ankle jerks 2+  and symmetric.        Data: Pertinent prior to visit   maging:  *MR BRAIN wo CONTRAST   EXAM: MR BRAIN wo CONTRAST   LOCATION: [facility]   DATE: 7/12/2023     INDICATION: Headache, chronic, no new features. Migraines. Memory   changes with family history of dementia.   COMPARISON: MRI dated 10/19/2020.   TECHNIQUE: Routine multiplanar multisequence head MRI without   intravenous contrast.     FINDINGS:   INTRACRANIAL CONTENTS: No acute/subacute infarct, acute hemorrhage,   extra-axial fluid collection, or mass. Unchanged focus of T2   prolongation within the subcortical white matter of the left superior   frontal gyrus, nonspecific and may be related to chronic migraine   disorder, demyelination, prior trauma, among other etiologies. No new   abnormal brain parenchymal signal intensity. Normal ventricles and   sulci for age. Normal position of the cerebellar tonsils.     SELLA: Within technique limitations, no gross abnormality.     OSSEOUS STRUCTURES/SOFT TISSUES: Normal marrow signal. The major   intracranial vascular flow voids are maintained.     ORBITS: Within technique limitations, no significant abnormality.     SINUSES/MASTOIDS: No significant paranasal sinus or mastoid mucosal   disease.      IMPRESSION:   1. Unchanged exam without an acute intracranial abnormality.           Exam Description: *MR THORACIC wo CONTRAST    Exam Code: [id number]/      Clinical History: Right chest wall pain radiating to T-6 through 8.        Technique: Multiplanar multisequence thoracic spine MRI without    contrast.        Comparison:  Plain films of 4/21/2017        Findings: Alignment and spinal cord are unremarkable.  No fracture.        C7-T1 through T12-L1:  No significant spinal canal or neural    foraminal stenosis.        Impression: Unremarkable thoracic spine MRI.     *MR LUMBAR SPINE wo CONTRAST   EXAM: MR LUMBAR SPINE wo CONTRAST   LOCATION: [facility]   DATE/TIME: 7/28/2021 9:25 AM      INDICATION: Low back pain, > 6 wks. Lumbar radiculopathy with   right-sided weakness. Lumbar radiculopathy. Right leg weakness.    COMPARISON: CTA of the abdomen and pelvis 06/16/2021.   TECHNIQUE: Routine Lumbar Spine MRI without IV contrast.     FINDINGS:   Nomenclature is based on 5 lumbar type vertebral bodies. Anatomic   alignment. Vertebral body height is preserved. There is no marrow or   ligamentous edema. No pars defect. Normal marrow signal. Normal   distal spinal cord and cauda equina with conus medullaris at inferior   L1. 7 mm cystic structure in the left parapelvic region may reflect a   simple-appearing extrarenal parapelvic cyst. This is stable to the   previous CT study. Unremarkable visualized bony pelvis.     T12-L1: Normal disc height and signal. No herniation. Normal facets.   No spinal canal or neural foraminal stenosis.     L1-L2: Normal disc height and signal. No herniation. Normal facets.   No spinal canal or neural foraminal stenosis.     L2-L3: Normal disc height and signal. No herniation. Normal facets.   No spinal canal or neural foraminal stenosis.     L3-L4: Slight loss of disc signal with normal disc height. Minimal   annular bulge. Central canal and neural foramina are patent. Normal   facets.     L4-L5: Normal disc height and disc signal. The central canal and   foramina are patent. Mild facet arthropathy with small bilateral   facet synovial effusions. Minimal annular bulge flattens the ventral   thecal sac.     L5-S1: Normal disc height and signal. No herniation. Normal facets.   No spinal canal or neural foraminal stenosis.     IMPRESSION:   1. Mild degenerative changes, detailed above, but without definite   explanation for right leg weakness.        The total time of this encounter today amounted to 40 minutes. This time included time spent with the patient, prep work, ordering tests, and performing post visit documentation.    The longitudinal plan of care for migraine  headaches was addressed during this visit. Due to the added complexity in care, I will continue to support Ms. Siddhartha in the subsequent management of this condition(s) and with the ongoing continuity of care of this condition(s).

## 2024-02-15 NOTE — TELEPHONE ENCOUNTER
I called patient and she is having facial numbness and tinging. Pt prefers to have in person appointment

## 2024-05-01 ENCOUNTER — MYC REFILL (OUTPATIENT)
Dept: NEUROLOGY | Facility: CLINIC | Age: 44
End: 2024-05-01
Payer: COMMERCIAL

## 2024-05-01 DIAGNOSIS — G43.009 MIGRAINE WITHOUT AURA AND WITHOUT STATUS MIGRAINOSUS, NOT INTRACTABLE: ICD-10-CM

## 2024-05-01 RX ORDER — RIZATRIPTAN BENZOATE 10 MG/1
TABLET ORAL
Qty: 12 TABLET | Refills: 6 | Status: SHIPPED | OUTPATIENT
Start: 2024-05-01

## 2024-05-01 NOTE — TELEPHONE ENCOUNTER
Pending Prescriptions:                       Disp   Refills    rizatriptan (MAXALT) 10 MG tablet         12 tab*6            Sig: TAKE 1 TABLET BY MOUTH AT ONSET OF HEADACHE    LOV:2/15/24    NOV: 06/04/24    Last Refill: 01/16/23    Chart Notes:    Trial Ubrelvy if it doesn't help go back to Maxalt

## 2024-06-03 NOTE — PROGRESS NOTES
[facility]  Section of General Neurology  Return Patient  Virtual Visit    Hannah Carl MRN# [id number]   Age: 44 year old YOB: 1980          Assessment and Plan:   Angela Carl is a pleasant 43 year old female who is seen in follow up primarily for migraine headaches.  Brain fog remains an issue and she is weaning off of lyrica and topamax to see if these are variables in this regard.   Maxalt has proven most effective in terms of as needed options (more so than Ubrelvy), has previously trialed gabapentin, propranolol, cymbalta, nortriptyline  Among others.  I think other CGRP preventative options with favorable side effect profile would be future options vs Namenda (a memory medication with headache data).  She would like the dust to settle a bit on her weaning.  She might consider staying at topamax 50 mg daily for a while and see how it does to discussion.  Discussed.   For low back and pelvic pain I would recommend she see Dr. Sifuentes to see if there are elements of PJD given she has been refractory to conventional management.  She will reach out if she would like a referral.   All questions answered.  We will check in again in 6 months, sooner with any issues questions or changes.          Ángel Monk MD   of Neurology   [facility]      Interval history:   She is at home, day off of work  Still works every other weekend  Tapering herself off of medications again  Gaining weight, unclear why.    Tapered off of lyrica.    Down to 100 mg at night of topamax.  Wonders about brain fog/word finding.    Lower back pain has become increasingly a big issue.    Not using NSAIDs, has an EGD tomorrow.    Hip pain too/pelvic pain at times.   Stretching variably helpful.    Dr. Sifuentes discussed.    Ubrelvy--doesn't work for her that she notes.    Maxalt works better    Reviewed previous trials  Previously cymbalta, lyrica, coming down on  topamax, propranolol (reynauds), nortriptyline  Discussed nurtec/CGRP injections, gabapentin (when off of lyrica--though was even more drowsy) as a future options      A/P at last visit  Angela Carl is a pleasant 43 year old female who is seen in follow up primarily for migraine headaches.   She has had issues with dizziness, tingling, facial sensory changes in the last little while.  Dizziness was evaluated locally and started vestibular PT with variable success.  She in fact thinks maxalt is the most helpful and we discussed that migrainous etiologies are pretty common.  She has normal imaging of her brain from 2023 and we discussed that I think repeating imaging would be rather low yield.  If neck symptoms progress substantially with this area of her neuroaxis never checked we could consider this pending her progression.  I see no clear red flags to history or exam today however.  It is possible her abrupt cymbalta cessation is a variable as well.  Discussed that I find it's cousin drug effexor better for migraine management as an option.  She has not gone higher on her topamax or started Ubrelvy of yet and I think trialing these previous recommendations would be a good idea too.  It will continue to be a trial and error process.       Plan:  Lyrica--100 mg BID  Topamax --100 at bedtime --Next option (our previous plan ())  Trial Ubrelvy if it doesn't help go back to Maxalt  Next daily option discussed---Effexor   Other options:   I do think she has room on lyrica or topiramate (as ordered/above)   Has existing follow up in early June, will keep this appointment      Past Medical History:   There is no problem list on file for this patient.    No past medical history on file.     Past Surgical History:   No past surgical history on file.     Social History:     Social History     Tobacco Use    Smoking status: Never    Smokeless tobacco: Never        Family History:   No family history on file.      Medications:     Current Outpatient Medications   Medication Sig Dispense Refill    clindamycin (CLINDAMAX) 1 % external gel       levothyroxine (SYNTHROID/LEVOTHROID) 112 MCG tablet Take 112 mcg by mouth      loratadine (CLARITIN REDITABS) 10 MG ODT Take 10 mg by mouth      montelukast (SINGULAIR) 10 MG tablet Take 1 tablet by mouth every morning      naproxen (NAPROSYN) 500 MG tablet TAKE 1 TABLET BY MOUTH TWICE DAILY AS NEEDED FOR HEADACHE      pregabalin (LYRICA) 50 MG capsule Take 100 mg by mouth 2 times daily      rizatriptan (MAXALT) 10 MG tablet TAKE 1 TABLET BY MOUTH AT ONSET OF HEADACHE 12 tablet 6    tiZANidine (ZANAFLEX) 4 MG tablet       topiramate (TOPAMAX) 50 MG tablet Take 1 tab (50mg) in the morning and 2 tabs (100mg) in the evening 270 tablet 1    triamcinolone (NASACORT) 55 MCG/ACT nasal aerosol Spray 1 spray in nostril      ubrogepant (UBRELVY) 100 MG tablet Take 1 tablet (100 mg) by mouth at onset of headache (Patient not taking: Reported on 2/15/2024) 8 tablet 4     No current facility-administered medications for this visit.        Allergies:     Allergies   Allergen Reactions    Fluticasone Rash    Latex Itching and Rash    Hydrocodone Headache and Other (See Comments)     Migraines          Review of Systems:   As noted above     Physical Exam:   General: Seated comfortably in no acute distress.  Neurologic:     Mental Status: Fully alert, attentive and oriented. Speech clear and fluent, no paraphasic errors.     Cranial Nerves:  Facial movements symmetric. Hearing not formally tested but intact to conversation.  No dysarthria.     Motor: No tremors or other abnormal movements observed.           Data: Pertinent prior to visit   MR BRAIN wo CONTRAST   EXAM: MR BRAIN wo CONTRAST   LOCATION: [facility]   DATE: 7/12/2023     INDICATION: Headache, chronic, no new features. Migraines. Memory   changes with family history of dementia.   COMPARISON: MRI dated 10/19/2020.   TECHNIQUE:  Routine multiplanar multisequence head MRI without   intravenous contrast.     FINDINGS:   INTRACRANIAL CONTENTS: No acute/subacute infarct, acute hemorrhage,   extra-axial fluid collection, or mass. Unchanged focus of T2   prolongation within the subcortical white matter of the left superior   frontal gyrus, nonspecific and may be related to chronic migraine   disorder, demyelination, prior trauma, among other etiologies. No new   abnormal brain parenchymal signal intensity. Normal ventricles and   sulci for age. Normal position of the cerebellar tonsils.     SELLA: Within technique limitations, no gross abnormality.     OSSEOUS STRUCTURES/SOFT TISSUES: Normal marrow signal. The major   intracranial vascular flow voids are maintained.     ORBITS: Within technique limitations, no significant abnormality.     SINUSES/MASTOIDS: No significant paranasal sinus or mastoid mucosal   disease.      IMPRESSION:   1. Unchanged exam without an acute intracranial abnormality.            Exam Description: *MR THORACIC wo CONTRAST    Exam Code: [email or web address]/      Clinical History: Right chest wall pain radiating to T-6 through 8.        Technique: Multiplanar multisequence thoracic spine MRI without    contrast.        Comparison:  Plain films of 4/21/2017        Findings: Alignment and spinal cord are unremarkable.  No fracture.        C7-T1 through T12-L1:  No significant spinal canal or neural    foraminal stenosis.        Impression: Unremarkable thoracic spine MRI.      *MR LUMBAR SPINE wo CONTRAST   EXAM: MR LUMBAR SPINE wo CONTRAST   LOCATION: [facility]   DATE/TIME: 7/28/2021 9:25 AM     INDICATION: Low back pain, > 6 wks. Lumbar radiculopathy with   right-sided weakness. Lumbar radiculopathy. Right leg weakness.    COMPARISON: CTA of the abdomen and pelvis 06/16/2021.   TECHNIQUE: Routine Lumbar Spine MRI without IV contrast.     FINDINGS:   Nomenclature is based on 5 lumbar type vertebral bodies. Anatomic    alignment. Vertebral body height is preserved. There is no marrow or   ligamentous edema. No pars defect. Normal marrow signal. Normal   distal spinal cord and cauda equina with conus medullaris at inferior   L1. 7 mm cystic structure in the left parapelvic region may reflect a   simple-appearing extrarenal parapelvic cyst. This is stable to the   previous CT study. Unremarkable visualized bony pelvis.     T12-L1: Normal disc height and signal. No herniation. Normal facets.   No spinal canal or neural foraminal stenosis.     L1-L2: Normal disc height and signal. No herniation. Normal facets.   No spinal canal or neural foraminal stenosis.     L2-L3: Normal disc height and signal. No herniation. Normal facets.   No spinal canal or neural foraminal stenosis.     L3-L4: Slight loss of disc signal with normal disc height. Minimal   annular bulge. Central canal and neural foramina are patent. Normal   facets.     L4-L5: Normal disc height and disc signal. The central canal and   foramina are patent. Mild facet arthropathy with small bilateral   facet synovial effusions. Minimal annular bulge flattens the ventral   thecal sac.     L5-S1: Normal disc height and signal. No herniation. Normal facets.   No spinal canal or neural foraminal stenosis.     IMPRESSION:   1. Mild degenerative changes, detailed above, but without definite   explanation for right leg weakness.                      The total time of this encounter today amounted to 30 minutes in total. This time included time spent with the patient, prep work, ordering tests, and performing post visit documentation.    The longitudinal plan of care for migraine headaches was addressed during this visit. Due to the added complexity in care, I will continue to support Ms. Carl  in the subsequent management of this condition(s) and with the ongoing continuity of care of this condition(s).

## 2024-06-04 ENCOUNTER — VIRTUAL VISIT (OUTPATIENT)
Dept: NEUROLOGY | Facility: CLINIC | Age: 44
End: 2024-06-04
Payer: COMMERCIAL

## 2024-06-04 DIAGNOSIS — R41.3 MEMORY CHANGES: ICD-10-CM

## 2024-06-04 DIAGNOSIS — G89.29 CHRONIC LOW BACK PAIN, UNSPECIFIED BACK PAIN LATERALITY, UNSPECIFIED WHETHER SCIATICA PRESENT: ICD-10-CM

## 2024-06-04 DIAGNOSIS — G43.009 MIGRAINE WITHOUT AURA AND WITHOUT STATUS MIGRAINOSUS, NOT INTRACTABLE: Primary | ICD-10-CM

## 2024-06-04 DIAGNOSIS — M54.50 CHRONIC LOW BACK PAIN, UNSPECIFIED BACK PAIN LATERALITY, UNSPECIFIED WHETHER SCIATICA PRESENT: ICD-10-CM

## 2024-06-04 PROCEDURE — G2211 COMPLEX E/M VISIT ADD ON: HCPCS | Mod: 95 | Performed by: STUDENT IN AN ORGANIZED HEALTH CARE EDUCATION/TRAINING PROGRAM

## 2024-06-04 PROCEDURE — 99214 OFFICE O/P EST MOD 30 MIN: CPT | Mod: 95 | Performed by: STUDENT IN AN ORGANIZED HEALTH CARE EDUCATION/TRAINING PROGRAM

## 2024-06-04 NOTE — LETTER
6/4/2024      Hannah Carl  [address] [address] [address]      Dear Colleague,    Thank you for referring your patient, Hannah Carl, to the [facility]. Please see a copy of my visit note below.    [facility]/[place]  Section of General Neurology  Return Patient  Virtual Visit    Hannah Carl MRN# [id number]   Age: 44 year old YOB: 1980          Assessment and Plan:   Angela Carl is a pleasant 43 year old female who is seen in follow up primarily for migraine headaches.  Brain fog remains an issue and she is weaning off of lyrica and topamax to see if these are variables in this regard.   Maxalt has proven most effective in terms of as needed options (more so than Ubrelvy), has previously trialed gabapentin, propranolol, cymbalta, nortriptyline  Among others.  I think other CGRP preventative options with favorable side effect profile would be future options vs Namenda (a memory medication with headache data).  She would like the dust to settle a bit on her weaning.  She might consider staying at topamax 50 mg daily for a while and see how it does to discussion.  Discussed.   For low back and pelvic pain I would recommend she see Dr. Sifuentes to see if there are elements of PJD given she has been refractory to conventional management.  She will reach out if she would like a referral.   All questions answered.  We will check in again in 6 months, sooner with any issues questions or changes.          Ángel Monk MD   of Neurology   [facility]      Interval history:   She is at home, day off of work  Still works every other weekend  Tapering herself off of medications again  Gaining weight, unclear why.    Tapered off of lyrica.    Down to 100 mg at night of topamax.  Wonders about brain fog/word finding.    Lower back pain has become increasingly a big issue.    Not using NSAIDs,  has an EGD tomorrow.    Hip pain too/pelvic pain at times.   Stretching variably helpful.    Dr. Sifuentes discussed.    Ubrelvy--doesn't work for her that she notes.    Maxalt works better    Reviewed previous trials  Previously cymbalta, lyrica, coming down on topamax, propranolol (reynauds), nortriptyline  Discussed nurtec/CGRP injections, gabapentin (when off of lyrica--though was even more drowsy) as a future options      A/P at last visit  Angela Carl is a pleasant 43 year old female who is seen in follow up primarily for migraine headaches.   She has had issues with dizziness, tingling, facial sensory changes in the last little while.  Dizziness was evaluated locally and started vestibular PT with variable success.  She in fact thinks maxalt is the most helpful and we discussed that migrainous etiologies are pretty common.  She has normal imaging of her brain from 2023 and we discussed that I think repeating imaging would be rather low yield.  If neck symptoms progress substantially with this area of her neuroaxis never checked we could consider this pending her progression.  I see no clear red flags to history or exam today however.  It is possible her abrupt cymbalta cessation is a variable as well.  Discussed that I find it's cousin drug effexor better for migraine management as an option.  She has not gone higher on her topamax or started Ubrelvy of yet and I think trialing these previous recommendations would be a good idea too.  It will continue to be a trial and error process.       Plan:  Lyrica--100 mg BID  Topamax --100 at bedtime --Next option (our previous plan ())  Trial Ubrelvy if it doesn't help go back to Maxalt  Next daily option discussed---Effexor   Other options:   I do think she has room on lyrica or topiramate (as ordered/above)   Has existing follow up in early June, will keep this appointment      Past Medical History:   There is no problem list on file for this patient.    No past  medical history on file.     Past Surgical History:   No past surgical history on file.     Social History:     Social History     Tobacco Use     Smoking status: Never     Smokeless tobacco: Never        Family History:   No family history on file.     Medications:     Current Outpatient Medications   Medication Sig Dispense Refill     clindamycin (CLINDAMAX) 1 % external gel        levothyroxine (SYNTHROID/LEVOTHROID) 112 MCG tablet Take 112 mcg by mouth       loratadine (CLARITIN REDITABS) 10 MG ODT Take 10 mg by mouth       montelukast (SINGULAIR) 10 MG tablet Take 1 tablet by mouth every morning       naproxen (NAPROSYN) 500 MG tablet TAKE 1 TABLET BY MOUTH TWICE DAILY AS NEEDED FOR HEADACHE       pregabalin (LYRICA) 50 MG capsule Take 100 mg by mouth 2 times daily       rizatriptan (MAXALT) 10 MG tablet TAKE 1 TABLET BY MOUTH AT ONSET OF HEADACHE 12 tablet 6     tiZANidine (ZANAFLEX) 4 MG tablet        topiramate (TOPAMAX) 50 MG tablet Take 1 tab (50mg) in the morning and 2 tabs (100mg) in the evening 270 tablet 1     triamcinolone (NASACORT) 55 MCG/ACT nasal aerosol Spray 1 spray in nostril       ubrogepant (UBRELVY) 100 MG tablet Take 1 tablet (100 mg) by mouth at onset of headache (Patient not taking: Reported on 2/15/2024) 8 tablet 4     No current facility-administered medications for this visit.        Allergies:     Allergies   Allergen Reactions     Fluticasone Rash     Latex Itching and Rash     Hydrocodone Headache and Other (See Comments)     Migraines          Review of Systems:   As noted above     Physical Exam:   General: Seated comfortably in no acute distress.  Neurologic:     Mental Status: Fully alert, attentive and oriented. Speech clear and fluent, no paraphasic errors.     Cranial Nerves:  Facial movements symmetric. Hearing not formally tested but intact to conversation.  No dysarthria.     Motor: No tremors or other abnormal movements observed.           Data: Pertinent prior to visit    MR BRAIN wo CONTRAST   EXAM: MR BRAIN wo CONTRAST   LOCATION: [facility]   DATE: 7/12/2023     INDICATION: Headache, chronic, no new features. Migraines. Memory   changes with family history of dementia.   COMPARISON: MRI dated 10/19/2020.   TECHNIQUE: Routine multiplanar multisequence head MRI without   intravenous contrast.     FINDINGS:   INTRACRANIAL CONTENTS: No acute/subacute infarct, acute hemorrhage,   extra-axial fluid collection, or mass. Unchanged focus of T2   prolongation within the subcortical white matter of the left superior   frontal gyrus, nonspecific and may be related to chronic migraine   disorder, demyelination, prior trauma, among other etiologies. No new   abnormal brain parenchymal signal intensity. Normal ventricles and   sulci for age. Normal position of the cerebellar tonsils.     SELLA: Within technique limitations, no gross abnormality.     OSSEOUS STRUCTURES/SOFT TISSUES: Normal marrow signal. The major   intracranial vascular flow voids are maintained.     ORBITS: Within technique limitations, no significant abnormality.     SINUSES/MASTOIDS: No significant paranasal sinus or mastoid mucosal   disease.      IMPRESSION:   1. Unchanged exam without an acute intracranial abnormality.            Exam Description: *MR THORACIC wo CONTRAST    Exam Code: [id number]/      Clinical History: Right chest wall pain radiating to T-6 through 8.        Technique: Multiplanar multisequence thoracic spine MRI without    contrast.        Comparison:  Plain films of 4/21/2017        Findings: Alignment and spinal cord are unremarkable.  No fracture.        C7-T1 through T12-L1:  No significant spinal canal or neural    foraminal stenosis.        Impression: Unremarkable thoracic spine MRI.      *MR LUMBAR SPINE wo CONTRAST   EXAM: MR LUMBAR SPINE wo CONTRAST   LOCATION: [facility]   DATE/TIME: 7/28/2021 9:25 AM     INDICATION: Low back pain, > 6 wks. Lumbar radiculopathy with    right-sided weakness. Lumbar radiculopathy. Right leg weakness.    COMPARISON: CTA of the abdomen and pelvis 06/16/2021.   TECHNIQUE: Routine Lumbar Spine MRI without IV contrast.     FINDINGS:   Nomenclature is based on 5 lumbar type vertebral bodies. Anatomic   alignment. Vertebral body height is preserved. There is no marrow or   ligamentous edema. No pars defect. Normal marrow signal. Normal   distal spinal cord and cauda equina with conus medullaris at inferior   L1. 7 mm cystic structure in the left parapelvic region may reflect a   simple-appearing extrarenal parapelvic cyst. This is stable to the   previous CT study. Unremarkable visualized bony pelvis.     T12-L1: Normal disc height and signal. No herniation. Normal facets.   No spinal canal or neural foraminal stenosis.     L1-L2: Normal disc height and signal. No herniation. Normal facets.   No spinal canal or neural foraminal stenosis.     L2-L3: Normal disc height and signal. No herniation. Normal facets.   No spinal canal or neural foraminal stenosis.     L3-L4: Slight loss of disc signal with normal disc height. Minimal   annular bulge. Central canal and neural foramina are patent. Normal   facets.     L4-L5: Normal disc height and disc signal. The central canal and   foramina are patent. Mild facet arthropathy with small bilateral   facet synovial effusions. Minimal annular bulge flattens the ventral   thecal sac.     L5-S1: Normal disc height and signal. No herniation. Normal facets.   No spinal canal or neural foraminal stenosis.     IMPRESSION:   1. Mild degenerative changes, detailed above, but without definite   explanation for right leg weakness.                      The total time of this encounter today amounted to 30 minutes in total. This time included time spent with the patient, prep work, ordering tests, and performing post visit documentation.    The longitudinal plan of care for migraine headaches was addressed during this visit. Due  to the added complexity in care, I will continue to support Ms. Carl  in the subsequent management of this condition(s) and with the ongoing continuity of care of this condition(s).      Angela is a 44 year old who is being evaluated via a billable video visit.    How would you like to obtain your AVS? MyChart  If the video visit is dropped, the invitation should be resent by: Text to cell phone: [phone]  Will anyone else be joining your video visit? No    Video-Visit Details    Type of service:  Video Visit   Originating Location (pt. Location): Home    Distant Location (provider location):  On-site  Platform used for Video Visit: [employer]  Video timing 9:54-10:10 AM      Again, thank you for allowing me to participate in the care of your patient.        Sincerely,        Delmar Monk MD

## 2024-06-04 NOTE — PATIENT INSTRUCTIONS
See how you do at topamax 50 mg for a while  Continue maxalt PRN   [employer] has data for migraines, an option a memory medication.   Reach out if you want to see Dr. Sifuentes for low back/pelvic pain.

## 2024-06-04 NOTE — PROGRESS NOTES
Angela is a 44 year old who is being evaluated via a billable video visit.    How would you like to obtain your AVS? [employer]  If the video visit is dropped, the invitation should be resent by: Text to cell phone: [phone]  Will anyone else be joining your video visit? No    Video-Visit Details    Type of service:  Video Visit   Originating Location (pt. Location): Home    Distant Location (provider location):  On-site  Platform used for Video Visit: [employer] timing 9:54-10:10 AM

## 2024-06-18 DIAGNOSIS — G43.009 MIGRAINE WITHOUT AURA AND WITHOUT STATUS MIGRAINOSUS, NOT INTRACTABLE: ICD-10-CM

## 2024-06-18 DIAGNOSIS — M79.7 FIBROMYALGIA: ICD-10-CM

## 2024-06-18 DIAGNOSIS — G89.29 CHRONIC LOW BACK PAIN, UNSPECIFIED BACK PAIN LATERALITY, UNSPECIFIED WHETHER SCIATICA PRESENT: Primary | ICD-10-CM

## 2024-06-18 DIAGNOSIS — M54.50 CHRONIC LOW BACK PAIN, UNSPECIFIED BACK PAIN LATERALITY, UNSPECIFIED WHETHER SCIATICA PRESENT: Primary | ICD-10-CM

## 2024-06-18 RX ORDER — PREGABALIN 50 MG/1
50 CAPSULE ORAL 3 TIMES DAILY
Qty: 90 CAPSULE | Refills: 5 | Status: SHIPPED | OUTPATIENT
Start: 2024-06-18

## 2024-06-18 NOTE — PATIENT INSTRUCTIONS
Magnesium oxide 400 mg Riboflavin (vitamin b2) 400 mg daily  Go down to 50 mg at bedtime of topiramate to see if word finding improves.    I would favor CGRP antagonists for next trial if we were to (nurtec, emgality  Continue cymbalta 60 mg daily total  Maxalt as needed  Keep working on getting good sleep too.   Patient given tobacco quitline number 92847980375 at this time, refusing to call at this time, states \" I just dont want to quit now\"- patient given information as to the dangers of long term tobacco use. Continue to reinforce the importance of tobacco cessation.

## 2024-06-21 ENCOUNTER — TELEPHONE (OUTPATIENT)
Dept: NEUROSURGERY | Facility: CLINIC | Age: 44
End: 2024-06-21
Payer: COMMERCIAL

## 2024-06-21 DIAGNOSIS — G43.009 MIGRAINE WITHOUT AURA AND WITHOUT STATUS MIGRAINOSUS, NOT INTRACTABLE: ICD-10-CM

## 2024-06-21 DIAGNOSIS — M54.50 CHRONIC LOW BACK PAIN, UNSPECIFIED BACK PAIN LATERALITY, UNSPECIFIED WHETHER SCIATICA PRESENT: Primary | ICD-10-CM

## 2024-06-21 DIAGNOSIS — G89.29 CHRONIC LOW BACK PAIN, UNSPECIFIED BACK PAIN LATERALITY, UNSPECIFIED WHETHER SCIATICA PRESENT: Primary | ICD-10-CM

## 2024-06-21 DIAGNOSIS — M79.7 FIBROMYALGIA: ICD-10-CM

## 2024-06-21 RX ORDER — METHYLPREDNISOLONE 4 MG
TABLET, DOSE PACK ORAL
Qty: 21 TABLET | Refills: 0 | Status: SHIPPED | OUTPATIENT
Start: 2024-06-21 | End: 2024-07-16

## 2024-06-21 NOTE — TELEPHONE ENCOUNTER
[facility] in [place] sent fax request requesting medrol dose pack for patients back pain. I called patient and LM for her to call back so we could get more information.

## 2024-06-24 NOTE — TELEPHONE ENCOUNTER
Called and spoke with patient. She had already received a refill from Dr. Monk, so she does not need another one. No further questions at this time.

## 2024-07-03 NOTE — PROGRESS NOTES
"    SUBJECTIVE:  HPI:  Hannah Carl  Is a 44 year old female who presents for new patient evaluation of low back and pelvic pain, upon referral from Dr. Delmar Monk who is interested in evaluation for Pelvic Joint Dysfunction.  She had an unrevealing lumbar MRI on 7/28/2021 for reported right leg weakness.    She reports that for a year without any inciting event she has had an achiness across the lumbosacral junction and if she transitions from sitting to standing she will feel it across the left inguinal area.  Once in a while her legs will feel heavy and if she sitting for a long time her legs feel \"like they are not there\".  There is no true numbness and there is no true tingling but there is an unusual sensation diffusely in both legs.  There is no true weakness and there is no saddle anesthesia, bowel or bladder dysfunction.  She has been to heme oncology, rheumatology, and neurology and \"they thought I had fibromyalgia\" and started her on Lyrica.  She tried tapering off of that and the pain got worse.  PT was not helpful.  Chiropractic gave only short-term relief.    Pain score and diagram reviewed.  See questionnaire.      ROS: .  Otherwise negative for bowel/bladder dysfunction, balance changes, headache, leg pain/numbness/weakness, fevers, chills, night sweats, unexplained weight loss;  otherwise unremarkable.   See the patient's intake questionnaire from today for details.    MEDICATIONS:  Reviewed.    ALLERGIES:  Reviewed.     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  Sports hobbies and activities:: None      OBJECTIVE:    IMAGING: Images and report reviewed    MR LUMBAR SPINE wo CONTRAST : 7/28/2021      7 mm cystic structure in the left parapelvic region may reflect a   simple-appearing extrarenal parapelvic cyst. This is stable to the   previous CT study.     L3-L4: Slight loss of " disc signal with normal disc height. Minimal   annular bulge. Central canal and neural foramina are patent. Normal   facets.     L4-L5: Normal disc height and disc signal. The central canal and   foramina are patent. Mild facet arthropathy with small bilateral   facet synovial effusions. Minimal annular bulge flattens the ventral   thecal sac.       IMPRESSION:   1. Mild degenerative changes, detailed above, but without definite   explanation for right leg weakness.     EXAMINATION:    --CONSTITUTIONAL:   No acute distress.  The patient is well nourished and well groomed.  She transitions slowly with slight pushoff.  Elevated BMI.  She moves about the room fluidly.  --SKIN:  Skin over the face, bilateral lower extremities, and posterior torso is clean, dry, intact without rashes.    --GAIT:  is non-antalgic. Flat foot, heel and toe walking:  normal   .  Squat and rise   normal    .  --STANDING EXAMINATION:    Symmetry of spine/pelvis   unremarkable   .      Range of motion full and painless in flexion.  Full with tight sensation in extension.   Standing flexion      positive left.    Phuong's sign       positive right greater than left.     Stork test   negative    .   --NEUROLOGICAL:    SENSATION to light touch is intact in bilateral thighs, lower legs and feet.   REFLEXES:  patellar 2+, and achilles 2+.  Babinski is negative. No clonus.  MANUAL MOTOR TESTING:  L1- S1 Myotomes, Femoral, Obturator, Peroneal and Tibial nerves 5/5   DURAL STRETCH TESTS:  SLR negative.  Femoral Stretch Test not done.   --PELVIC/HIP JOINTS:                Long Sitting   negative   in the left is at most 0.5 cm short.    Hip scour   negative   .  Negative Gaenslen's, pelvic compression, pelvic distraction  Hip Impingement   negative   .   AGATA   negative    .     Piriformis      negative left mildly positive right.   Spring testing she cries out in pain with spring testing of the entire lumbar column and both SI joints.  Complaints  however is symmetric and normal.      PELVIC ALIGNMENT neutral.   --LUMBAR/GLUTEAL MUSCLES: Tender in all of the muscles of the back and buttocks again with the pain response.        ASSESSMENT: Hannah Carl is a 44 year old female who presents  today for new patient evaluation of:    Incidental left renal cyst  No evidence of Pelvic Joint Dysfunction  Neurologically intact  Pre-existing diagnosis of fibromyalgia, compatible with her clinical presentation      PLAN:  Follow-up with PCP regarding left renal incidental finding on her 2021 lumbar MRI-Warned    She is already being managed for her fibromyalgia.  I am afraid I do not have a lot I can add to her care.  We can rule out Pelvic Joint Dysfunction as a diagnosis however.    I like to thank Dr. Monk for the opportunity to participate in the care of this patient.    Advised patient to call or return early if symptoms worsen, or having problems controlling bladder and bowel function or worsening leg weakness.     Please note: Voice recognition software was used in this dictation.  It may therefore contain typographical errors.    Edin Sifuentes MD

## 2024-07-16 ENCOUNTER — OFFICE VISIT (OUTPATIENT)
Dept: NEUROSURGERY | Facility: CLINIC | Age: 44
End: 2024-07-16
Payer: COMMERCIAL

## 2024-07-16 VITALS
DIASTOLIC BLOOD PRESSURE: 84 MMHG | WEIGHT: 220 LBS | BODY MASS INDEX: 36.65 KG/M2 | HEART RATE: 70 BPM | SYSTOLIC BLOOD PRESSURE: 120 MMHG | HEIGHT: 65 IN

## 2024-07-16 DIAGNOSIS — M79.7 FIBROMYALGIA: Primary | ICD-10-CM

## 2024-07-16 PROCEDURE — 99203 OFFICE O/P NEW LOW 30 MIN: CPT | Performed by: PREVENTIVE MEDICINE

## 2024-07-16 RX ORDER — LEVOCETIRIZINE DIHYDROCHLORIDE 5 MG/1
5 TABLET, FILM COATED ORAL EVERY EVENING
COMMUNITY

## 2024-07-16 NOTE — LETTER
"7/16/2024      Hannah Carl  [address] [address] [address]      Dear Colleague,    Thank you for referring your patient, Hannah Carl, to the [facility] [place]. Please see a copy of my visit note below.        SUBJECTIVE:  HPI:  Hannah Carl  Is a 44 year old female who presents for new patient evaluation of low back and pelvic pain, upon referral from Dr. Delmar Monk who is interested in evaluation for Pelvic Joint Dysfunction.  She had an unrevealing lumbar MRI on 7/28/2021 for reported right leg weakness.    She reports that for a year without any inciting event she has had an achiness across the lumbosacral junction and if she transitions from sitting to standing she will feel it across the left inguinal area.  Once in a while her legs will feel heavy and if she sitting for a long time her legs feel \"like they are not there\".  There is no true numbness and there is no true tingling but there is an unusual sensation diffusely in both legs.  There is no true weakness and there is no saddle anesthesia, bowel or bladder dysfunction.  She has been to heme oncology, rheumatology, and neurology and \"they thought I had fibromyalgia\" and started her on Lyrica.  She tried tapering off of that and the pain got worse.  PT was not helpful.  Chiropractic gave only short-term relief.    Pain score and diagram reviewed.  See questionnaire.      ROS: .  Otherwise negative for bowel/bladder dysfunction, balance changes, headache, leg pain/numbness/weakness, fevers, chills, night sweats, unexplained weight loss;  otherwise unremarkable.   See the patient's intake questionnaire from today for details.    MEDICATIONS:  Reviewed.    ALLERGIES:  Reviewed.     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  Sports hobbies and activities:: " None      OBJECTIVE:    IMAGING: Images and report reviewed    MR LUMBAR SPINE wo CONTRAST : 7/28/2021      7 mm cystic structure in the left parapelvic region may reflect a   simple-appearing extrarenal parapelvic cyst. This is stable to the   previous CT study.     L3-L4: Slight loss of disc signal with normal disc height. Minimal   annular bulge. Central canal and neural foramina are patent. Normal   facets.     L4-L5: Normal disc height and disc signal. The central canal and   foramina are patent. Mild facet arthropathy with small bilateral   facet synovial effusions. Minimal annular bulge flattens the ventral   thecal sac.       IMPRESSION:   1. Mild degenerative changes, detailed above, but without definite   explanation for right leg weakness.     EXAMINATION:    --CONSTITUTIONAL:   No acute distress.  The patient is well nourished and well groomed.  She transitions slowly with slight pushoff.  Elevated BMI.  She moves about the room fluidly.  --SKIN:  Skin over the face, bilateral lower extremities, and posterior torso is clean, dry, intact without rashes.    --GAIT:  is non-antalgic. Flat foot, heel and toe walking:  normal   .  Squat and rise   normal    .  --STANDING EXAMINATION:    Symmetry of spine/pelvis   unremarkable   .      Range of motion full and painless in flexion.  Full with tight sensation in extension.   Standing flexion      positive left.    Phuong's sign       positive right greater than left.     Stork test   negative    .   --NEUROLOGICAL:    SENSATION to light touch is intact in bilateral thighs, lower legs and feet.   REFLEXES:  patellar 2+, and achilles 2+.  Babinski is negative. No clonus.  MANUAL MOTOR TESTING:  L1- S1 Myotomes, Femoral, Obturator, Peroneal and Tibial nerves 5/5   DURAL STRETCH TESTS:  SLR negative.  Femoral Stretch Test not done.   --PELVIC/HIP JOINTS:                Long Sitting   negative   in the left is at most 0.5 cm short.    Hip scour   negative   .   Negative Gaenslen's, pelvic compression, pelvic distraction  Hip Impingement   negative   .   AGATA   negative    .     Piriformis      negative left mildly positive right.   Spring testing she cries out in pain with spring testing of the entire lumbar column and both SI joints.  Complaints however is symmetric and normal.      PELVIC ALIGNMENT neutral.   --LUMBAR/GLUTEAL MUSCLES: Tender in all of the muscles of the back and buttocks again with the pain response.        ASSESSMENT: Hannah Carl is a 44 year old female who presents  today for new patient evaluation of:    Incidental left renal cyst  No evidence of Pelvic Joint Dysfunction  Neurologically intact  Pre-existing diagnosis of fibromyalgia, compatible with her clinical presentation      PLAN:  Follow-up with PCP regarding left renal incidental finding on her 2021 lumbar MRI-Warned    She is already being managed for her fibromyalgia.  I am afraid I do not have a lot I can add to her care.  We can rule out Pelvic Joint Dysfunction as a diagnosis however.    I like to thank Dr. Monk for the opportunity to participate in the care of this patient.    Advised patient to call or return early if symptoms worsen, or having problems controlling bladder and bowel function or worsening leg weakness.     Please note: Voice recognition software was used in this dictation.  It may therefore contain typographical errors.    Edin Sifuentes MD             Again, thank you for allowing me to participate in the care of your patient.        Sincerely,        Edin Sifuentes MD

## 2024-07-16 NOTE — NURSING NOTE
"Reason For Visit:   Chief Complaint   Patient presents with    New Patient      back pain, referral from Dr. 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  Work status?  Full time.    Sports: no  Activities: no             /84 (BP Location: Right arm, Patient Position: Sitting, Cuff Size: Adult Regular)   Pulse 70   Ht 1.651 m (5' 5\")   Wt 99.8 kg (220 lb)   BMI 36.61 kg/m        Allergies   Allergen Reactions    Fluticasone Rash    Latex Itching and Rash    Hydrocodone Headache and Other (See Comments)     Migraines         Current Outpatient Medications   Medication Sig Dispense Refill    clindamycin (CLINDAMAX) 1 % external gel       levocetirizine (XYZAL) 5 MG tablet Take 5 mg by mouth every evening      levothyroxine (SYNTHROID/LEVOTHROID) 112 MCG tablet Take 112 mcg by mouth      montelukast (SINGULAIR) 10 MG tablet Take 1 tablet by mouth every morning      naproxen (NAPROSYN) 500 MG tablet TAKE 1 TABLET BY MOUTH TWICE DAILY AS NEEDED FOR HEADACHE      pregabalin (LYRICA) 50 MG capsule Take 1 capsule (50 mg) by mouth 3 times daily 90 capsule 5    rizatriptan (MAXALT) 10 MG tablet TAKE 1 TABLET BY MOUTH AT ONSET OF HEADACHE 12 tablet 6    tiZANidine (ZANAFLEX) 4 MG tablet       topiramate (TOPAMAX) 50 MG tablet Take 1 tab (50mg) in the morning and 2 tabs (100mg) in the evening 270 tablet 1    triamcinolone (NASACORT) 55 MCG/ACT nasal aerosol Spray 1 spray in nostril      methylPREDNISolone (MEDROL DOSEPAK) 4 MG tablet therapy pack Follow Package Directions (Patient not taking: Reported on 7/16/2024) 21 tablet 0     No current facility-administered medications for this visit.         Chuck Joe MA    "

## 2024-07-16 NOTE — PATIENT INSTRUCTIONS
It was nice to meet you and I am sorry you are hurting so much.  I suspect that fibromyalgia is as good a diagnosis is any for your condition but your spine is perfect and your pelvis is not out of alignment.  Follow-up with your family doctor regarding that renal cyst we discussed.  I wish you all the best.  See the assessment and plan below for further details of our conversation today    ASSESSMENT: Hannah Carl is a 44 year old female who presents  today for new patient evaluation of:    Incidental left renal cyst  No evidence of Pelvic Joint Dysfunction  Neurologically intact  Pre-existing diagnosis of fibromyalgia, compatible with her clinical presentation      PLAN:  Follow-up with PCP regarding left renal incidental finding on her 2021 lumbar MRI-Warned    She is already being managed for her fibromyalgia.  I am afraid I do not have a lot I can add to her care.  We can rule out Pelvic Joint Dysfunction as a diagnosis however.

## 2024-09-23 ENCOUNTER — MYC MEDICAL ADVICE (OUTPATIENT)
Dept: NEUROLOGY | Facility: CLINIC | Age: 44
End: 2024-09-23
Payer: COMMERCIAL

## 2024-09-23 DIAGNOSIS — G43.009 MIGRAINE WITHOUT AURA AND WITHOUT STATUS MIGRAINOSUS, NOT INTRACTABLE: ICD-10-CM

## 2024-09-23 NOTE — TELEPHONE ENCOUNTER
Routing refill request to provider for review/approval because:  Medication is reported/historical  Last OV 6/4/24  NOV: 12/27/24    Rebecca RN, BSN  [facility]

## 2024-10-05 ENCOUNTER — HEALTH MAINTENANCE LETTER (OUTPATIENT)
Age: 44
End: 2024-10-05

## 2024-12-14 ENCOUNTER — HEALTH MAINTENANCE LETTER (OUTPATIENT)
Age: 44
End: 2024-12-14

## 2024-12-16 ENCOUNTER — MYC MEDICAL ADVICE (OUTPATIENT)
Dept: NEUROLOGY | Facility: CLINIC | Age: 44
End: 2024-12-16
Payer: COMMERCIAL

## 2024-12-16 DIAGNOSIS — G43.009 MIGRAINE WITHOUT AURA AND WITHOUT STATUS MIGRAINOSUS, NOT INTRACTABLE: ICD-10-CM

## 2024-12-16 RX ORDER — TOPIRAMATE 50 MG/1
TABLET, FILM COATED ORAL
Qty: 270 TABLET | Refills: 1 | Status: SHIPPED | OUTPATIENT
Start: 2024-12-16

## 2024-12-16 NOTE — TELEPHONE ENCOUNTER
Per LOV note:  Brain fog remains an issue and she is weaning off of lyrica and topamax to see if these are variables in this regard.     Message sent to patient to see if she is still taking topiramate.     Rebecca RN, BSN  [facility]

## 2024-12-16 NOTE — TELEPHONE ENCOUNTER
Pending Prescriptions:                       Disp   Refills    topiramate (TOPAMAX) 50 MG tablet         270 ta*1            Sig: Take 1 tab (50mg) in the morning and 2 tabs           (100mg) in the evening      Last office:6/4/2024  Next office visit: 12/27/2024

## 2024-12-26 NOTE — PROGRESS NOTES
[facility]  Section of General Neurology  Return Patient  Virtual Visit    Hannah Carl MRN# [id number]   Age: 44 year old YOB: 1980            Assessment and Plan:   Assessment:  Angela Carl is a pleasant 44 year old female who is seen in follow up primarily for migraine headaches.   She has learned in weaning attempts that she requires topamax/this is helpful.  We will increase this as below.  Ubrelvy continues to help PRN. Discussed teratogenicity/no plans for pregnancy.  We also discussed worsening muscle spasm/cramps and some options in this regard.  I am hopeful we can improve these.  If no better we can meet again sooner to discuss options such as increasing lyrica further.  All questions answered. Plan as below.     Plan:  Increase topamax to 150 mg at bedtime  No changes to lyrica  Add in Magnesium oxide 400 mg B complex vitamin for cramping  Add robaxin PRN for muscle spasm, do not use with zanaflex  Continue Ubrelvy 100 mg  PRN  Follow up in 6 months sooner with any issues questions or changes    Video data  Used Jonny  Pt location-home  Provider location: on site  Timing 10:00-10:12 AM    Ángel Monk MD   of Neurology   [facility]      Interval history:     Trying to wean off of topamax--migraines got worse. Back on 100 mg at night, consideration of 150 mg  No plans for pregnancy, discussed teratogenicity  Lyrica--at 50/100.   Seeing a pain doctor now too.   One a week on average  Maxalt less helpful now  Tiny muscle contractions, electrical impulses through body   Ubrelvy --more helpful.    Magnesium oxide 400 mg B complex for muscle twitching--these could help   Muscle relaxants another option--on tizanidine PRN, robaxin a future consideration    A/P at last visit  Angela Carl is a pleasant 43 year old female who is seen in follow up primarily for migraine headaches.  Brain fog remains an issue and she is  weaning off of lyrica and topamax to see if these are variables in this regard.   Maxalt has proven most effective in terms of as needed options (more so than Ubrelvy), has previously trialed gabapentin, propranolol, cymbalta, nortriptyline  Among others.  I think other CGRP preventative options with favorable side effect profile would be future options vs Namenda (a memory medication with headache data).  She would like the dust to settle a bit on her weaning.  She might consider staying at topamax 50 mg daily for a while and see how it does to discussion.  Discussed.   For low back and pelvic pain I would recommend she see Dr. Sifuentes to see if there are elements of PJD given she has been refractory to conventional management.  She will reach out if she would like a referral.   All questions answered.  We will check in again in 6 months, sooner with any issues questions or changes.          Dr Sifuentes note reviewed 7/2024     ASSESSMENT: Hannah Carl is a 44 year old female who presents  today for new patient evaluation of:    Incidental left renal cyst  No evidence of Pelvic Joint Dysfunction  Neurologically intact  Pre-existing diagnosis of fibromyalgia, compatible with her clinical presentation        PLAN:  Follow-up with PCP regarding left renal incidental finding on her 2021 lumbar MRI-Warned     She is already being managed for her fibromyalgia.  I am afraid I do not have a lot I can add to her care.  We can rule out Pelvic Joint Dysfunction as a diagnosis however.     I like to thank Dr. Monk for the opportunity to participate in the care of this patient.     Advised patient to call or return early if symptoms worsen, or having problems controlling bladder and bowel function or worsening leg weakness.      Please note: Voice recognition software was used in this dictation.  It may therefore contain typographical errors.     Edin Sifuentes MD    Past Medical History:   There is no problem list on file for  this patient.    No past medical history on file.     Past Surgical History:   No past surgical history on file.     Social History:     Social History     Tobacco Use    Smoking status: Never    Smokeless tobacco: Never        Family History:   No family history on file.     Medications:     Current Outpatient Medications   Medication Sig Dispense Refill    clindamycin (CLINDAMAX) 1 % external gel       levocetirizine (XYZAL) 5 MG tablet Take 5 mg by mouth every evening      levothyroxine (SYNTHROID/LEVOTHROID) 112 MCG tablet Take 112 mcg by mouth      montelukast (SINGULAIR) 10 MG tablet Take 1 tablet by mouth every morning      naproxen (NAPROSYN) 500 MG tablet TAKE 1 TABLET BY MOUTH TWICE DAILY AS NEEDED FOR HEADACHE      pregabalin (LYRICA) 50 MG capsule Take 1 capsule (50 mg) by mouth 3 times daily 90 capsule 5    rizatriptan (MAXALT) 10 MG tablet TAKE 1 TABLET BY MOUTH AT ONSET OF HEADACHE 12 tablet 6    tiZANidine (ZANAFLEX) 4 MG tablet       topiramate (TOPAMAX) 50 MG tablet Take 1 tab (50mg) in the morning and 2 tabs (100mg) in the evening 270 tablet 1    triamcinolone (NASACORT) 55 MCG/ACT nasal aerosol Spray 1 spray in nostril      ubrogepant (UBRELVY) 100 MG tablet Take 1 tablet (100 mg) by mouth at onset of headache. 8 tablet 4     No current facility-administered medications for this visit.        Allergies:     Allergies   Allergen Reactions    Fluticasone Rash    Latex Itching and Rash    Hydrocodone Headache and Other (See Comments)     Migraines          Review of Systems:   As noted above     Physical Exam:   General: Seated comfortably in no acute distress.  Neurologic:     Mental Status: Fully alert, attentive and oriented. Speech clear and fluent, no paraphasic errors.     Cranial Nerves: EOM appear intact. Facial movements symmetric. Hearing not formally tested but intact to conversation.  No dysarthria.     Motor: No tremors or other abnormal movements observed.      Sensory:Not able to be  tested virtually                   The longitudinal plan of care for migraine headaches was addressed during this visit. Due to the added complexity in care, I will continue to support Ms Carl in the subsequent management of this condition(s) and with the ongoing continuity of care of this condition(s).

## 2024-12-27 ENCOUNTER — VIRTUAL VISIT (OUTPATIENT)
Dept: NEUROLOGY | Facility: CLINIC | Age: 44
End: 2024-12-27
Payer: COMMERCIAL

## 2024-12-27 DIAGNOSIS — G43.009 MIGRAINE WITHOUT AURA AND WITHOUT STATUS MIGRAINOSUS, NOT INTRACTABLE: ICD-10-CM

## 2024-12-27 DIAGNOSIS — M62.838 MUSCLE SPASM: Primary | ICD-10-CM

## 2024-12-27 PROCEDURE — 99214 OFFICE O/P EST MOD 30 MIN: CPT | Mod: 95 | Performed by: STUDENT IN AN ORGANIZED HEALTH CARE EDUCATION/TRAINING PROGRAM

## 2024-12-27 PROCEDURE — G2211 COMPLEX E/M VISIT ADD ON: HCPCS | Performed by: STUDENT IN AN ORGANIZED HEALTH CARE EDUCATION/TRAINING PROGRAM

## 2024-12-27 RX ORDER — TOPIRAMATE 50 MG/1
TABLET, FILM COATED ORAL
Qty: 270 TABLET | Refills: 1 | Status: SHIPPED | OUTPATIENT
Start: 2024-12-27

## 2024-12-27 RX ORDER — METHOCARBAMOL 500 MG/1
500 TABLET, FILM COATED ORAL 4 TIMES DAILY PRN
Qty: 120 TABLET | Refills: 4 | Status: SHIPPED | OUTPATIENT
Start: 2024-12-27

## 2024-12-27 NOTE — LETTER
12/27/2024      Hannah Carl  [address] [address] [address]      Dear Colleague,    Thank you for referring your patient, Hannah Carl, to the [facility]. Please see a copy of my visit note below.    [facility]/[place]  Section of General Neurology  Return Patient  Virtual Visit    Hannah Carl MRN# [id number]   Age: 44 year old YOB: 1980            Assessment and Plan:   Assessment:  Angela Carl is a pleasant 44 year old female who is seen in follow up primarily for migraine headaches.   She has learned in weaning attempts that she requires topamax/this is helpful.  We will increase this as below.  Ubrelvy continues to help PRN. Discussed teratogenicity/no plans for pregnancy.  We also discussed worsening muscle spasm/cramps and some options in this regard.  I am hopeful we can improve these.  If no better we can meet again sooner to discuss options such as increasing lyrica further.  All questions answered. Plan as below.     Plan:  Increase topamax to 150 mg at bedtime  No changes to lyrica  Add in Magnesium oxide 400 mg B complex vitamin for cramping  Add robaxin PRN for muscle spasm, do not use with zanaflex  Continue Ubrelvy 100 mg  PRN  Follow up in 6 months sooner with any issues questions or changes    Video data  Used Jonny  Pt location-home  Provider location: on site  Timing 10:00-10:12 AM    Ángel Monk MD   of Neurology   [facility]      Interval history:     Trying to wean off of topamax--migraines got worse. Back on 100 mg at night, consideration of 150 mg  No plans for pregnancy, discussed teratogenicity  Lyrica--at 50/100.   Seeing a pain doctor now too.   One a week on average  Maxalt less helpful now  Tiny muscle contractions, electrical impulses through body   Ubrelvy --more helpful.    Magnesium oxide 400 mg B complex for muscle twitching--these  could help   Muscle relaxants another option--on tizanidine PRN, robaxin a future consideration    A/P at last visit  Angela Carl is a pleasant 43 year old female who is seen in follow up primarily for migraine headaches.  Brain fog remains an issue and she is weaning off of lyrica and topamax to see if these are variables in this regard.   Maxalt has proven most effective in terms of as needed options (more so than Ubrelvy), has previously trialed gabapentin, propranolol, cymbalta, nortriptyline  Among others.  I think other CGRP preventative options with favorable side effect profile would be future options vs Namenda (a memory medication with headache data).  She would like the dust to settle a bit on her weaning.  She might consider staying at topamax 50 mg daily for a while and see how it does to discussion.  Discussed.   For low back and pelvic pain I would recommend she see Dr. Sifuentes to see if there are elements of PJD given she has been refractory to conventional management.  She will reach out if she would like a referral.   All questions answered.  We will check in again in 6 months, sooner with any issues questions or changes.          Dr Sifuentes note reviewed 7/2024     ASSESSMENT: Hannah Carl is a 44 year old female who presents  today for new patient evaluation of:    Incidental left renal cyst  No evidence of Pelvic Joint Dysfunction  Neurologically intact  Pre-existing diagnosis of fibromyalgia, compatible with her clinical presentation        PLAN:  Follow-up with PCP regarding left renal incidental finding on her 2021 lumbar MRI-Warned     She is already being managed for her fibromyalgia.  I am afraid I do not have a lot I can add to her care.  We can rule out Pelvic Joint Dysfunction as a diagnosis however.     I like to thank Dr. Monk for the opportunity to participate in the care of this patient.     Advised patient to call or return early if symptoms worsen, or having problems  controlling bladder and bowel function or worsening leg weakness.      Please note: Voice recognition software was used in this dictation.  It may therefore contain typographical errors.     Edin Sifuentes MD    Past Medical History:   There is no problem list on file for this patient.    No past medical history on file.     Past Surgical History:   No past surgical history on file.     Social History:     Social History     Tobacco Use     Smoking status: Never     Smokeless tobacco: Never        Family History:   No family history on file.     Medications:     Current Outpatient Medications   Medication Sig Dispense Refill     clindamycin (CLINDAMAX) 1 % external gel        levocetirizine (XYZAL) 5 MG tablet Take 5 mg by mouth every evening       levothyroxine (SYNTHROID/LEVOTHROID) 112 MCG tablet Take 112 mcg by mouth       montelukast (SINGULAIR) 10 MG tablet Take 1 tablet by mouth every morning       naproxen (NAPROSYN) 500 MG tablet TAKE 1 TABLET BY MOUTH TWICE DAILY AS NEEDED FOR HEADACHE       pregabalin (LYRICA) 50 MG capsule Take 1 capsule (50 mg) by mouth 3 times daily 90 capsule 5     rizatriptan (MAXALT) 10 MG tablet TAKE 1 TABLET BY MOUTH AT ONSET OF HEADACHE 12 tablet 6     tiZANidine (ZANAFLEX) 4 MG tablet        topiramate (TOPAMAX) 50 MG tablet Take 1 tab (50mg) in the morning and 2 tabs (100mg) in the evening 270 tablet 1     triamcinolone (NASACORT) 55 MCG/ACT nasal aerosol Spray 1 spray in nostril       ubrogepant (UBRELVY) 100 MG tablet Take 1 tablet (100 mg) by mouth at onset of headache. 8 tablet 4     No current facility-administered medications for this visit.        Allergies:     Allergies   Allergen Reactions     Fluticasone Rash     Latex Itching and Rash     Hydrocodone Headache and Other (See Comments)     Migraines          Review of Systems:   As noted above     Physical Exam:   General: Seated comfortably in no acute distress.  Neurologic:     Mental Status: Fully alert, attentive and  oriented. Speech clear and fluent, no paraphasic errors.     Cranial Nerves: EOM appear intact. Facial movements symmetric. Hearing not formally tested but intact to conversation.  No dysarthria.     Motor: No tremors or other abnormal movements observed.      Sensory:Not able to be tested virtually                   The longitudinal plan of care for migraine headaches was addressed during this visit. Due to the added complexity in care, I will continue to support Ms Carl in the subsequent management of this condition(s) and with the ongoing continuity of care of this condition(s).      Again, thank you for allowing me to participate in the care of your patient.        Sincerely,        Delmar Monk MD    Electronically signed

## 2024-12-27 NOTE — PATIENT INSTRUCTIONS
Increase topamax to 150 mg at bedtime  No changes to lyrica  Add in Magnesium oxide 400 mg B complex vitamin for cramping  Add robaxin PRN for muscle spasm, do not use with zanaflex  Follow up in 6 months sooner with any issues questions or changes

## 2025-02-06 DIAGNOSIS — M62.838 MUSCLE SPASM: Primary | ICD-10-CM

## 2025-02-06 RX ORDER — CYCLOBENZAPRINE HCL 5 MG
5 TABLET ORAL 3 TIMES DAILY PRN
Qty: 90 TABLET | Refills: 4 | Status: SHIPPED | OUTPATIENT
Start: 2025-02-06

## 2025-03-11 ENCOUNTER — TELEPHONE (OUTPATIENT)
Dept: NEUROLOGY | Facility: CLINIC | Age: 45
End: 2025-03-11
Payer: COMMERCIAL

## 2025-03-11 NOTE — TELEPHONE ENCOUNTER
Prior Authorization Retail Medication Request    Medication/Dose: ubrogepant (UBRELVY) 100 MG tablet  Diagnosis and ICD code (if different than what is on RX):  intractable migraine G43.009  New/renewal/insurance change PA/secondary ins. PA:  Previously Tried and Failed:    Rationale:      Insurance   Primary: [employer]  Insurance ID:  [id number]    Secondary (if applicable):[employer]   Insurance ID:  [id number]    Pharmacy Information (if different than what is on RX)  Name:  [facility] [address] [place] [address]   Phone:  [phone]   Fax: [phone]    Clinic Information  Preferred routing pool for dept communication: [facility] RN

## 2025-03-13 NOTE — TELEPHONE ENCOUNTER
Retail Pharmacy Prior Authorization Team   Phone: [phone]    PA Initiation    Medication: UBRELVY 100 MG PO TABS  Insurance Company: [employer] ([employer]) - Phone [phone] Fax [phone]  Pharmacy Filling the Rx: [facility] [address] [place], [place] - [address] [facility]  Filling Pharmacy Phone: [phone]  Filling Pharmacy Fax:    Start Date: 3/13/2025

## 2025-03-13 NOTE — TELEPHONE ENCOUNTER
Note: Due to record-high volumes, our turn-around time is taking longer than usual . We are currently 4  business days behind in the pools.   We are working diligently to submit all requests in a timely manner and in the order they are received. Please only flag TRUE URGENT requests as high priority to the pool at this time.   If you have questions on status of PA's,  please send a note/message in the active PA encounter and send back to the [facility] PA pool [[id number]].    If you have questions about the turn-around time or about our process, please reach out to our supervisor Xiao Porras.   Thank you!   RPPA (Retail Pharmacy Prior Authorization) team    Prior Authorization Approval    Authorization Effective Date: 2/11/2025  Authorization Expiration Date: 3/13/2026  Medication: Ubrelvy-APPROVED  Reference #:     Insurance Company: [employer] ([employer]) - Phone [phone] Fax [phone]  Which Pharmacy is filling the prescription (Not needed for infusion/clinic administered): [facility] [address] [place] [address]  Pharmacy Notified: Yes  Patient Notified: Instructed pharmacy to notify patient when script is ready to /ship.